# Patient Record
Sex: MALE | Race: WHITE | NOT HISPANIC OR LATINO | Employment: OTHER | URBAN - METROPOLITAN AREA
[De-identification: names, ages, dates, MRNs, and addresses within clinical notes are randomized per-mention and may not be internally consistent; named-entity substitution may affect disease eponyms.]

---

## 2017-03-15 ENCOUNTER — DOCUMENTATION (OUTPATIENT)
Dept: PHYSICAL THERAPY | Facility: HOSPITAL | Age: 47
End: 2017-03-15

## 2017-03-15 DIAGNOSIS — M25.561 RIGHT KNEE PAIN, UNSPECIFIED CHRONICITY: Primary | ICD-10-CM

## 2017-03-15 NOTE — THERAPY DISCHARGE NOTE
Outpatient Physical Therapy Ortho Progress Note/Discharge Summary       Patient Name: Ruperto Michelle  : 1970  MRN: [id number]  Today's Date: 3/15/2017      Visit Date: 03/15/2017    Visit Dx:    ICD-10-CM ICD-9-CM   1. Right knee pain, unspecified chronicity M25.561 719.46       Patient Active Problem List   Diagnosis   • Rupture of anterior cruciate ligament of right knee        No past medical history on file.     Past Surgical History   Procedure Laterality Date   • Vasectomy                               PT Assessment/Plan       03/15/17 0500       PT Assessment    Assessment Comments Pt has made some gains with decreased pain and improved strength and function. He still has episodes of his knee buckling and he still has some discomfort with going up/down stairs. He is independent with his HEP. He has choosen to have ACL reconstructive surgery, but it is not scheduled at this time. He will continue with his HEP leading up to surgery.  -     PT Plan    PT Plan Comments Pt is to continue his HEP until surgery.  -       User Key  (r) = Recorded By, (t) = Taken By, (c) = Cosigned By    Initials Name Provider Type     Hector Blair, PT Physical Therapist                                       PT OP Goals       03/15/17 0500       PT Short Term Goals    STG Date to Achieve 16  -     STG 1 Decrease right knee pain to 3-4/10 with activity.  -     STG 1 Progress Met  -     STG 2 Decrease right knee effusion to WFL.  -     STG 2 Progress Not Met  -     STG 3 Increase right hamstring flexibility to WFL with tesing.  -     STG 3 Progress Not Met  -     STG 4 Pt will be independent with his HEP.  -     STG 4 Progress Met  -     Long Term Goals    LTG Date to Achieve 16  -     LTG 1 decrease right knee pain to 0-1/10 with activity.  -     LTG 1 Progress Not Met  -     LTG 2 Increase right quad and hip strength to 5/5 with testing.  -     LTG 2 Progress Partially Met   -     LTG 3 Pt will be independent with all ADLs without pain and have a LEFS score > 70.  -     LTG 3 Progress Partially Met  -       User Key  (r) = Recorded By, (t) = Taken By, (c) = Cosigned By    Initials Name Provider Type    RUEPRTO Blair, PT Physical Therapist                    Time Calculation:                  OP PT Discharge Summary  Reason for Discharge:  (Pt will have reconstructive surgery-to do HEP until then.)  Outcomes Achieved: Patient able to partially acheive established goals  Discharge Destination: Home with home program      Hector Blair, YASMINE  3/15/2017

## 2017-03-17 ENCOUNTER — PREP FOR SURGERY (OUTPATIENT)
Dept: ORTHOPEDIC SURGERY | Facility: CLINIC | Age: 47
End: 2017-03-17

## 2017-03-17 ENCOUNTER — TELEPHONE (OUTPATIENT)
Dept: ORTHOPEDIC SURGERY | Facility: CLINIC | Age: 47
End: 2017-03-17

## 2017-03-17 DIAGNOSIS — S83.511A RUPTURE OF ANTERIOR CRUCIATE LIGAMENT OF RIGHT KNEE, INITIAL ENCOUNTER: Primary | ICD-10-CM

## 2017-03-17 RX ORDER — PREGABALIN 25 MG/1
75 CAPSULE ORAL ONCE
Status: CANCELLED | OUTPATIENT
Start: 2017-03-17 | End: 2017-03-17

## 2017-03-17 RX ORDER — OXYCODONE HCL 10 MG/1
10 TABLET, FILM COATED, EXTENDED RELEASE ORAL ONCE
Status: CANCELLED | OUTPATIENT
Start: 2017-03-17 | End: 2017-03-17

## 2017-03-17 RX ORDER — CELECOXIB 100 MG/1
200 CAPSULE ORAL ONCE
Status: CANCELLED | OUTPATIENT
Start: 2017-03-17 | End: 2017-03-17

## 2017-03-17 RX ORDER — SODIUM CHLORIDE 0.9 % (FLUSH) 0.9 %
1-10 SYRINGE (ML) INJECTION AS NEEDED
Status: CANCELLED | OUTPATIENT
Start: 2017-03-17

## 2017-03-27 ENCOUNTER — APPOINTMENT (OUTPATIENT)
Dept: LAB | Facility: HOSPITAL | Age: 47
End: 2017-03-27

## 2017-03-27 ENCOUNTER — APPOINTMENT (OUTPATIENT)
Dept: PREADMISSION TESTING | Facility: HOSPITAL | Age: 47
End: 2017-03-27

## 2017-03-27 VITALS
HEART RATE: 71 BPM | DIASTOLIC BLOOD PRESSURE: 99 MMHG | WEIGHT: 236.1 LBS | HEIGHT: 68 IN | BODY MASS INDEX: 35.78 KG/M2 | OXYGEN SATURATION: 96 % | RESPIRATION RATE: 16 BRPM | SYSTOLIC BLOOD PRESSURE: 142 MMHG

## 2017-03-27 DIAGNOSIS — S83.511A RUPTURE OF ANTERIOR CRUCIATE LIGAMENT OF RIGHT KNEE, INITIAL ENCOUNTER: ICD-10-CM

## 2017-03-27 LAB
ANION GAP SERPL CALCULATED.3IONS-SCNC: 13.1 MMOL/L
APTT PPP: 29.9 SECONDS (ref 24.3–38.1)
BASOPHILS # BLD AUTO: 0.03 10*3/MM3 (ref 0–0.2)
BASOPHILS NFR BLD AUTO: 0.8 % (ref 0–2)
BUN BLD-MCNC: 13 MG/DL (ref 6–20)
BUN/CREAT SERPL: 13.8 (ref 7–25)
CALCIUM SPEC-SCNC: 8.9 MG/DL (ref 8.6–10.5)
CHLORIDE SERPL-SCNC: 101 MMOL/L (ref 98–107)
CO2 SERPL-SCNC: 25.9 MMOL/L (ref 22–29)
CREAT BLD-MCNC: 0.94 MG/DL (ref 0.76–1.27)
DEPRECATED RDW RBC AUTO: 40.9 FL (ref 37–54)
EOSINOPHIL # BLD AUTO: 0.1 10*3/MM3 (ref 0.1–0.3)
EOSINOPHIL NFR BLD AUTO: 2.5 % (ref 0–4)
ERYTHROCYTE [DISTWIDTH] IN BLOOD BY AUTOMATED COUNT: 13.2 % (ref 11.5–14.5)
GFR SERPL CREATININE-BSD FRML MDRD: 87 ML/MIN/1.73
GLUCOSE BLD-MCNC: 98 MG/DL (ref 65–99)
HCT VFR BLD AUTO: 45.4 % (ref 42–52)
HGB BLD-MCNC: 15.2 G/DL (ref 14–18)
IMM GRANULOCYTES # BLD: 0.01 10*3/MM3 (ref 0–0.03)
IMM GRANULOCYTES NFR BLD: 0.3 % (ref 0–0.5)
INR PPP: 0.96 (ref 0.9–1.1)
LYMPHOCYTES # BLD AUTO: 1.45 10*3/MM3 (ref 0.6–4.8)
LYMPHOCYTES NFR BLD AUTO: 36.5 % (ref 20–45)
MCH RBC QN AUTO: 28.6 PG (ref 27–31)
MCHC RBC AUTO-ENTMCNC: 33.5 G/DL (ref 31–37)
MCV RBC AUTO: 85.5 FL (ref 80–94)
MONOCYTES # BLD AUTO: 0.23 10*3/MM3 (ref 0–1)
MONOCYTES NFR BLD AUTO: 5.8 % (ref 3–8)
NEUTROPHILS # BLD AUTO: 2.15 10*3/MM3 (ref 1.5–8.3)
NEUTROPHILS NFR BLD AUTO: 54.1 % (ref 45–70)
NRBC BLD MANUAL-RTO: 0 /100 WBC (ref 0–0)
PLATELET # BLD AUTO: 243 10*3/MM3 (ref 140–500)
PMV BLD AUTO: 10.1 FL (ref 7.4–10.4)
POTASSIUM BLD-SCNC: 4.3 MMOL/L (ref 3.5–5.2)
PROTHROMBIN TIME: 12.8 SECONDS (ref 12.1–15)
RBC # BLD AUTO: 5.31 10*6/MM3 (ref 4.7–6.1)
SODIUM BLD-SCNC: 140 MMOL/L (ref 136–145)
WBC NRBC COR # BLD: 3.97 10*3/MM3 (ref 4.8–10.8)

## 2017-03-27 PROCEDURE — 85610 PROTHROMBIN TIME: CPT | Performed by: ORTHOPAEDIC SURGERY

## 2017-03-27 PROCEDURE — 85025 COMPLETE CBC W/AUTO DIFF WBC: CPT | Performed by: ORTHOPAEDIC SURGERY

## 2017-03-27 PROCEDURE — 93010 ELECTROCARDIOGRAM REPORT: CPT | Performed by: INTERNAL MEDICINE

## 2017-03-27 PROCEDURE — 80048 BASIC METABOLIC PNL TOTAL CA: CPT | Performed by: ORTHOPAEDIC SURGERY

## 2017-03-27 PROCEDURE — 85730 THROMBOPLASTIN TIME PARTIAL: CPT | Performed by: ORTHOPAEDIC SURGERY

## 2017-03-27 PROCEDURE — 36415 COLL VENOUS BLD VENIPUNCTURE: CPT

## 2017-03-27 PROCEDURE — 93005 ELECTROCARDIOGRAM TRACING: CPT

## 2017-03-27 NOTE — DISCHARGE INSTRUCTIONS
PRE-ADMISSION TESTING INSTRUCTIONS FOR ADULTS      General Instructions:    • Do not eat or drink after midnight: includes water, mints, or gum. You may brush your teeth.  • Do not smoke, chew tobacco, or drink alcohol.  • Bring medications in original bottles, any inhalers and if applicable your C-PAP/ BI-PAP machine.  • Wear clean comfortable clothes and socks.  • Do not wear contact lenses, lotion, deodorant, or make-up.  Bring a case for your glasses if applicable.   • Bring crutches or walker if applicable.  • Leave all other valuables and jewelry at home.      Preventing a Surgical Site Infection:    • Shower the night before and on the morning of surgery using the chlorhexidine soap you were given.  Use a clean washcloth with the soap. Do not use the CHG soap on your hair, face, or private areas. Wash your body gently for five (5) minutes. Do not scrub your skin too hard.  Dry with a clean towel and dress in clean clothing.    • Do not shave the surgical area for a week prior to surgery  because the razor can irritate skin and make it easier to develop an infection.    • Make sure you, your family, and all healthcare providers clean their hands with soap and water or an alcohol based hand  before caring for you or your wound.    • If at all possible, quit smoking as many days before surgery as you can.    Day of surgery:    Your surgeon’s office will advise you of your arrival time for the day of surgery.    Upon arrival, a Pre-op nurse and Anesthesia provider will review your health history, obtain vital signs, and answer questions you may have.  The only belongings needed at this time will be your home medications and if applicable your C-PAP/BI-PAP machine.  If you are staying overnight your family can leave the rest of your belongings in the car and bring them to your room later.  A Pre-op nurse will start an IV and you may receive medication in preparation for surgery, including something to  help you relax.  Your family will be able to see you in the Pre-op area.  While you are in surgery your family should notify the waiting room  if they leave the waiting room area and provide a contact phone number.    IF you have any questions, you can call the Pre-Admission Department at [phone] or your surgeon's office.    Please be aware that surgery does come with discomfort.  We want to make every effort to control your discomfort so please discuss any uncontrolled symptoms with your nurse.   Your doctor will most likely have prescribed pain medications.      If you are going home after surgery, you will receive individualized written care instructions before being discharged.  A responsible adult (over the age of 18) must drive you to and from the hospital on the day of your surgery and stay with you for 24 hours after anesthesia.    If you are staying overnight following surgery, you will be transported to your hospital room following the recovery period.  [facility] has all private rooms.    Deductibles and co-payments are collected on the day of service. Please be prepared to pay the required co-pay, deductible or deposit on the day of service as defined by your plan.

## 2017-04-07 ENCOUNTER — ANESTHESIA EVENT (OUTPATIENT)
Dept: PERIOP | Facility: HOSPITAL | Age: 47
End: 2017-04-07

## 2017-04-09 NOTE — H&P
Patient ID: Ruperto Michelle is a 46 y.o. male.     Chief Complaint:  Right knee pain and instability  History of Present Illness  Ruperto Michelle presents for surgical treatment of right knee ACL rupture.  He has had issues with his right knee including pain and instability that began in September 2016 when he slipped on deltoid, noted a sudden buckling giving way of his right knee at that point time and since then assess issues with intermittent burning pain to his right knee. He is also had buckling episodes to his knee approximately weekly though that has improved over the last several weeks. Pain and issues especially buckling is exacerbated primarily with going up and down stairs, rates associated pain as 2-3 out of 10, aching in nature, localized primarily to the lateral aspect of the right knee. Denies any maximilian locking or catching. Denies prior injury or issues to right knee. Denies any right hip or groin pain, no fevers chills or sweats. Mild intermittent swelling.    No current facility-administered medications on file prior to encounter.      No current outpatient prescriptions on file prior to encounter.       No Known Allergies        Social History          Occupational History   • Not on file.           Social History Main Topics   • Smoking status: Current Every Day Smoker   • Smokeless tobacco: Never Used   • Alcohol use Yes   • Drug use: Defer   • Sexual activity: Defer      History reviewed. No pertinent past medical history.        Past Surgical History   Procedure Laterality Date   • Vasectomy             History reviewed. No pertinent family history.        Review of Systems   Constitutional: Negative for chills, diaphoresis, fever and unexpected weight change.   HENT: Negative for hearing loss, nosebleeds, sore throat and tinnitus.   Eyes: Negative for pain and visual disturbance.   Respiratory: Negative for cough, shortness of breath and wheezing.   Cardiovascular: Negative for chest pain  "and palpitations.   Gastrointestinal: Negative for abdominal pain, diarrhea, nausea and vomiting.   Endocrine: Negative for cold intolerance, heat intolerance and polydipsia.   Genitourinary: Negative for difficulty urinating, dysuria and hematuria.   Musculoskeletal: Negative for arthralgias, joint swelling and myalgias.   Skin: Negative for rash and wound.   Allergic/Immunologic: Negative for environmental allergies.   Neurological: Negative for dizziness, syncope and numbness.   Hematological: Does not bruise/bleed easily.   Psychiatric/Behavioral: Negative for dysphoric mood and sleep disturbance. The patient is not nervous/anxious.   All other systems reviewed and are negative.            Objective:      Vitals:     12/02/16 0846   BP: 134/83   Pulse: 68   Weight: 220 lb (99.8 kg)   Height: 67\" (170.2 cm)          Last 2 weights     12/02/16 0846   Weight: 220 lb (99.8 kg)      Body mass index is 34.46 kg/(m^2).  Physical Exam     Vital signs reviewed.   General: No acute distress.  Eyes: conjunctiva clear; pupils equally round and reactive  ENT: external ears and nose atraumatic; oropharynx clear  CV: no peripheral edema  Resp: normal respiratory effort  Skin: no rashes or wounds; normal turgor  Psych: mood and affect appropriate; recent and remote memory intact      Ortho Exam    Right knee-active range of motion 0-130°, 4+ out of 5 strength on flexion and extension. Grade 2B Lachman, 2+ anterior drawer soft endpoint, negative posterior drawer, negative posterior lateral drawer, negative dial test. Stable to varus valgus stress 0 and 30°. Minimal effusion noted, no tenderness along medial or lateral joint line, negative Gabriela exam. Negative active patellar compression test, negative patellar apprehension test. Brisk cap refill all digits, 2+ dorsalis pulse right foot. Positive sensation light touch all discretions right lower extremity symmetric to the left.  Imaging:  Review of outside x-ray report, no " images available, indicate no evidence of fracture, dislocation, or subluxation.     Review of outside MRI as well as radiology report indicates disruption of anterior cruciate ligament, read as a partial tear by radiologist, on my review I think this is more consistent with a complete tear especially considering patient's physical exam. No evidence of medial or lateral meniscal tear, no evidence of full-thickness chondral defect. Subchondral edema noted in the posterior lateral tibial plateau. PCL intact  Assessment:        1. Rupture of anterior cruciate ligament of right knee, initial encounter           Plan:  ELSY query complete.         Discussed treatment options at length with patient today. Given recurrence instability as well as desire to participate and pivoting and cutting recreational activities, he would like to proceed with surgical reconstruction of his anterior cruciate ligament.  Plan will be for Right knee ACL reconstruction with allograft, meniscal cartilage surgery as indicated. I reviewed details of procedure including pertinent anatomy with the patient as well as risks benefits and alternatives, with the risks including not limited to neurovascular damage, bleeding, infection, re-tear of graft, failure of healing of graft, loss of motion, weakness, stiffness, instability, chondrolysis, DVT, pulmonary embolus, death, stroke, complex regional pain syndrome, myocardial infarction, need for additional procedures.  Patient understood all these had all questions answered before consenting to proceed with surgery.  No guarantees were given regarding results of procedure.     Ruperto Michelle was in agreement with plan and had all questions answered.           Luis transcription disclaimer     Much of this encounter note is an electronic transcription/translation of spoken language to printed text. The electronic translation of spoken language may permit erroneous, or at times, nonsensical words or  phrases to be inadvertently transcribed. Although I have reviewed the note for such errors, some may still exist.

## 2017-04-10 ENCOUNTER — ANESTHESIA (OUTPATIENT)
Dept: PERIOP | Facility: HOSPITAL | Age: 47
End: 2017-04-10

## 2017-04-10 ENCOUNTER — HOSPITAL ENCOUNTER (OUTPATIENT)
Facility: HOSPITAL | Age: 47
Setting detail: HOSPITAL OUTPATIENT SURGERY
Discharge: HOME | End: 2017-04-10
Attending: ORTHOPAEDIC SURGERY | Admitting: ORTHOPAEDIC SURGERY

## 2017-04-10 VITALS
DIASTOLIC BLOOD PRESSURE: 83 MMHG | OXYGEN SATURATION: 97 % | BODY MASS INDEX: 35.49 KG/M2 | WEIGHT: 233.4 LBS | HEART RATE: 88 BPM | RESPIRATION RATE: 12 BRPM | SYSTOLIC BLOOD PRESSURE: 126 MMHG | TEMPERATURE: 97.8 F

## 2017-04-10 DIAGNOSIS — S83.511A RUPTURE OF ANTERIOR CRUCIATE LIGAMENT OF RIGHT KNEE, INITIAL ENCOUNTER: ICD-10-CM

## 2017-04-10 PROCEDURE — C1713 ANCHOR/SCREW BN/BN,TIS/BN: HCPCS | Performed by: ORTHOPAEDIC SURGERY

## 2017-04-10 PROCEDURE — 29888 ARTHRS AID ACL RPR/AGMNTJ: CPT | Performed by: ORTHOPAEDIC SURGERY

## 2017-04-10 PROCEDURE — 25010000002 ONDANSETRON PER 1 MG: Performed by: NURSE ANESTHETIST, CERTIFIED REGISTERED

## 2017-04-10 PROCEDURE — 25010000003 CEFAZOLIN PER 500 MG: Performed by: ORTHOPAEDIC SURGERY

## 2017-04-10 PROCEDURE — 25010000002 DEXAMETHASONE PER 1 MG: Performed by: NURSE ANESTHETIST, CERTIFIED REGISTERED

## 2017-04-10 PROCEDURE — 25010000002 FENTANYL CITRATE (PF) 100 MCG/2ML SOLUTION: Performed by: NURSE ANESTHETIST, CERTIFIED REGISTERED

## 2017-04-10 PROCEDURE — 25010000002 MIDAZOLAM PER 1 MG: Performed by: NURSE ANESTHETIST, CERTIFIED REGISTERED

## 2017-04-10 PROCEDURE — 25010000002 MIDAZOLAM PER 1 MG

## 2017-04-10 PROCEDURE — 25010000002 HYDROMORPHONE PER 4 MG: Performed by: NURSE ANESTHETIST, CERTIFIED REGISTERED

## 2017-04-10 PROCEDURE — 25010000002 PROPOFOL 10 MG/ML EMULSION: Performed by: NURSE ANESTHETIST, CERTIFIED REGISTERED

## 2017-04-10 PROCEDURE — 25010000002 HYDROMORPHONE PER 4 MG

## 2017-04-10 PROCEDURE — 25010000002 DEXAMETHASONE PER 1 MG

## 2017-04-10 PROCEDURE — 25010000002 ONDANSETRON PER 1 MG

## 2017-04-10 DEVICE — BIOSURE REGENSORB INTERFERENCE                                    SCREW 7 MM X 25MM
Type: IMPLANTABLE DEVICE | Site: KNEE | Status: FUNCTIONAL
Brand: BIOSURE

## 2017-04-10 DEVICE — IMPLANTABLE DEVICE: Type: IMPLANTABLE DEVICE | Site: KNEE | Status: FUNCTIONAL

## 2017-04-10 DEVICE — BIOSURE REGENSORB INTERFERENCE                                    SCREW 9 MM X 25MM
Type: IMPLANTABLE DEVICE | Site: KNEE | Status: FUNCTIONAL
Brand: BIOSURE

## 2017-04-10 RX ORDER — OXYCODONE HCL 10 MG/1
TABLET, FILM COATED, EXTENDED RELEASE ORAL
Status: COMPLETED
Start: 2017-04-10 | End: 2017-04-10

## 2017-04-10 RX ORDER — DEXAMETHASONE SODIUM PHOSPHATE 4 MG/ML
4 INJECTION, SOLUTION INTRA-ARTICULAR; INTRALESIONAL; INTRAMUSCULAR; INTRAVENOUS; SOFT TISSUE ONCE
Status: COMPLETED | OUTPATIENT
Start: 2017-04-10 | End: 2017-04-10

## 2017-04-10 RX ORDER — DEXAMETHASONE SODIUM PHOSPHATE 4 MG/ML
INJECTION, SOLUTION INTRA-ARTICULAR; INTRALESIONAL; INTRAMUSCULAR; INTRAVENOUS; SOFT TISSUE
Status: COMPLETED
Start: 2017-04-10 | End: 2017-04-10

## 2017-04-10 RX ORDER — LIDOCAINE HYDROCHLORIDE 20 MG/ML
INJECTION, SOLUTION INFILTRATION; PERINEURAL AS NEEDED
Status: DISCONTINUED | OUTPATIENT
Start: 2017-04-10 | End: 2017-04-10 | Stop reason: SURG

## 2017-04-10 RX ORDER — LIDOCAINE HYDROCHLORIDE 10 MG/ML
0.5 INJECTION, SOLUTION INFILTRATION; PERINEURAL ONCE AS NEEDED
Status: COMPLETED | OUTPATIENT
Start: 2017-04-10 | End: 2017-04-10

## 2017-04-10 RX ORDER — MAGNESIUM HYDROXIDE 1200 MG/15ML
LIQUID ORAL AS NEEDED
Status: DISCONTINUED | OUTPATIENT
Start: 2017-04-10 | End: 2017-04-10 | Stop reason: HOSPADM

## 2017-04-10 RX ORDER — DEXAMETHASONE SODIUM PHOSPHATE 10 MG/ML
INJECTION INTRAMUSCULAR; INTRAVENOUS AS NEEDED
Status: DISCONTINUED | OUTPATIENT
Start: 2017-04-10 | End: 2017-04-10 | Stop reason: SURG

## 2017-04-10 RX ORDER — CELECOXIB 100 MG/1
CAPSULE ORAL
Status: COMPLETED
Start: 2017-04-10 | End: 2017-04-10

## 2017-04-10 RX ORDER — SODIUM CHLORIDE 0.9 % (FLUSH) 0.9 %
1-10 SYRINGE (ML) INJECTION AS NEEDED
Status: DISCONTINUED | OUTPATIENT
Start: 2017-04-10 | End: 2017-04-10 | Stop reason: HOSPADM

## 2017-04-10 RX ORDER — BUPIVACAINE HYDROCHLORIDE AND EPINEPHRINE 5; 5 MG/ML; UG/ML
INJECTION, SOLUTION EPIDURAL; INTRACAUDAL; PERINEURAL AS NEEDED
Status: DISCONTINUED | OUTPATIENT
Start: 2017-04-10 | End: 2017-04-10 | Stop reason: SURG

## 2017-04-10 RX ORDER — MIDAZOLAM HYDROCHLORIDE 1 MG/ML
2 INJECTION INTRAMUSCULAR; INTRAVENOUS
Status: DISCONTINUED | OUTPATIENT
Start: 2017-04-10 | End: 2017-04-10 | Stop reason: HOSPADM

## 2017-04-10 RX ORDER — HYDROMORPHONE HCL 110MG/55ML
PATIENT CONTROLLED ANALGESIA SYRINGE INTRAVENOUS
Status: COMPLETED
Start: 2017-04-10 | End: 2017-04-10

## 2017-04-10 RX ORDER — HYDROCODONE BITARTRATE AND ACETAMINOPHEN 5; 325 MG/1; MG/1
1 TABLET ORAL ONCE AS NEEDED
Status: COMPLETED | OUTPATIENT
Start: 2017-04-10 | End: 2017-04-10

## 2017-04-10 RX ORDER — SENNOSIDES 8.6 MG
1 TABLET ORAL NIGHTLY
Qty: 30 EACH | Refills: 0 | Status: SHIPPED | OUTPATIENT
Start: 2017-04-10 | End: 2017-04-18

## 2017-04-10 RX ORDER — MINERAL OIL 471.99 G/472ML
OIL TOPICAL AS NEEDED
Status: DISCONTINUED | OUTPATIENT
Start: 2017-04-10 | End: 2017-04-10 | Stop reason: HOSPADM

## 2017-04-10 RX ORDER — SODIUM CHLORIDE, SODIUM LACTATE, POTASSIUM CHLORIDE, CALCIUM CHLORIDE 600; 310; 30; 20 MG/100ML; MG/100ML; MG/100ML; MG/100ML
1000 INJECTION, SOLUTION INTRAVENOUS CONTINUOUS PRN
Status: DISCONTINUED | OUTPATIENT
Start: 2017-04-10 | End: 2017-04-10 | Stop reason: HOSPADM

## 2017-04-10 RX ORDER — OXYCODONE HYDROCHLORIDE AND ACETAMINOPHEN 5; 325 MG/1; MG/1
1-2 TABLET ORAL EVERY 4 HOURS PRN
Qty: 60 TABLET | Refills: 0 | Status: SHIPPED | OUTPATIENT
Start: 2017-04-10 | End: 2017-04-18

## 2017-04-10 RX ORDER — LIDOCAINE HYDROCHLORIDE 10 MG/ML
INJECTION, SOLUTION EPIDURAL; INFILTRATION; INTRACAUDAL; PERINEURAL
Status: COMPLETED
Start: 2017-04-10 | End: 2017-04-10

## 2017-04-10 RX ORDER — PROPOFOL 10 MG/ML
VIAL (ML) INTRAVENOUS AS NEEDED
Status: DISCONTINUED | OUTPATIENT
Start: 2017-04-10 | End: 2017-04-10 | Stop reason: SURG

## 2017-04-10 RX ORDER — ONDANSETRON 4 MG/1
4 TABLET, FILM COATED ORAL EVERY 8 HOURS PRN
Qty: 30 TABLET | Refills: 0 | Status: SHIPPED | OUTPATIENT
Start: 2017-04-10 | End: 2017-04-18

## 2017-04-10 RX ORDER — FAMOTIDINE 10 MG/ML
INJECTION, SOLUTION INTRAVENOUS
Status: COMPLETED
Start: 2017-04-10 | End: 2017-04-10

## 2017-04-10 RX ORDER — ONDANSETRON 2 MG/ML
4 INJECTION INTRAMUSCULAR; INTRAVENOUS ONCE AS NEEDED
Status: COMPLETED | OUTPATIENT
Start: 2017-04-10 | End: 2017-04-10

## 2017-04-10 RX ORDER — MIDAZOLAM HYDROCHLORIDE 1 MG/ML
1 INJECTION INTRAMUSCULAR; INTRAVENOUS
Status: DISCONTINUED | OUTPATIENT
Start: 2017-04-10 | End: 2017-04-10 | Stop reason: HOSPADM

## 2017-04-10 RX ORDER — HYDROMORPHONE HCL 110MG/55ML
0.5 PATIENT CONTROLLED ANALGESIA SYRINGE INTRAVENOUS
Status: DISCONTINUED | OUTPATIENT
Start: 2017-04-10 | End: 2017-04-10 | Stop reason: HOSPADM

## 2017-04-10 RX ORDER — ASPIRIN 325 MG
325 TABLET, DELAYED RELEASE (ENTERIC COATED) ORAL DAILY
Qty: 14 TABLET | Refills: 0 | Status: SHIPPED | OUTPATIENT
Start: 2017-04-10 | End: 2017-04-24

## 2017-04-10 RX ORDER — FAMOTIDINE 10 MG/ML
20 INJECTION, SOLUTION INTRAVENOUS
Status: DISCONTINUED | OUTPATIENT
Start: 2017-04-10 | End: 2017-04-10 | Stop reason: HOSPADM

## 2017-04-10 RX ORDER — PREGABALIN 75 MG/1
CAPSULE ORAL
Status: COMPLETED
Start: 2017-04-10 | End: 2017-04-10

## 2017-04-10 RX ORDER — HYDROCODONE BITARTRATE AND ACETAMINOPHEN 5; 325 MG/1; MG/1
TABLET ORAL
Status: COMPLETED
Start: 2017-04-10 | End: 2017-04-10

## 2017-04-10 RX ORDER — ONDANSETRON 2 MG/ML
INJECTION INTRAMUSCULAR; INTRAVENOUS
Status: COMPLETED
Start: 2017-04-10 | End: 2017-04-10

## 2017-04-10 RX ORDER — FENTANYL CITRATE 50 UG/ML
INJECTION, SOLUTION INTRAMUSCULAR; INTRAVENOUS AS NEEDED
Status: DISCONTINUED | OUTPATIENT
Start: 2017-04-10 | End: 2017-04-10 | Stop reason: SURG

## 2017-04-10 RX ORDER — OXYCODONE HCL 10 MG/1
10 TABLET, FILM COATED, EXTENDED RELEASE ORAL ONCE
Status: COMPLETED | OUTPATIENT
Start: 2017-04-10 | End: 2017-04-10

## 2017-04-10 RX ORDER — PREGABALIN 75 MG/1
75 CAPSULE ORAL ONCE
Status: COMPLETED | OUTPATIENT
Start: 2017-04-10 | End: 2017-04-10

## 2017-04-10 RX ORDER — CELECOXIB 100 MG/1
200 CAPSULE ORAL ONCE
Status: COMPLETED | OUTPATIENT
Start: 2017-04-10 | End: 2017-04-10

## 2017-04-10 RX ORDER — MIDAZOLAM HYDROCHLORIDE 1 MG/ML
INJECTION INTRAMUSCULAR; INTRAVENOUS
Status: COMPLETED
Start: 2017-04-10 | End: 2017-04-10

## 2017-04-10 RX ORDER — KETAMINE HYDROCHLORIDE 100 MG/ML
INJECTION INTRAMUSCULAR; INTRAVENOUS AS NEEDED
Status: DISCONTINUED | OUTPATIENT
Start: 2017-04-10 | End: 2017-04-10 | Stop reason: SURG

## 2017-04-10 RX ADMIN — HYDROCODONE BITARTRATE AND ACETAMINOPHEN 1 TABLET: 5; 325 TABLET ORAL at 10:15

## 2017-04-10 RX ADMIN — FENTANYL CITRATE 25 MCG: 50 INJECTION, SOLUTION INTRAMUSCULAR; INTRAVENOUS at 08:07

## 2017-04-10 RX ADMIN — FAMOTIDINE 20 MG: 10 INJECTION, SOLUTION INTRAVENOUS at 07:03

## 2017-04-10 RX ADMIN — CELECOXIB 200 MG: 100 CAPSULE ORAL at 06:06

## 2017-04-10 RX ADMIN — SODIUM CHLORIDE, POTASSIUM CHLORIDE, SODIUM LACTATE AND CALCIUM CHLORIDE 1000 ML: 600; 310; 30; 20 INJECTION, SOLUTION INTRAVENOUS at 14:30

## 2017-04-10 RX ADMIN — MIDAZOLAM HYDROCHLORIDE 1 MG: 1 INJECTION, SOLUTION INTRAMUSCULAR; INTRAVENOUS at 07:09

## 2017-04-10 RX ADMIN — PREGABALIN 75 MG: 75 CAPSULE ORAL at 06:07

## 2017-04-10 RX ADMIN — LIDOCAINE HYDROCHLORIDE 0.1 ML: 10 INJECTION, SOLUTION INFILTRATION; PERINEURAL at 06:10

## 2017-04-10 RX ADMIN — HYDROMORPHONE HYDROCHLORIDE 0.5 MG: 2 INJECTION, SOLUTION INTRAMUSCULAR; INTRAVENOUS; SUBCUTANEOUS at 09:58

## 2017-04-10 RX ADMIN — HYDROMORPHONE HYDROCHLORIDE 0.5 MG: 2 INJECTION, SOLUTION INTRAMUSCULAR; INTRAVENOUS; SUBCUTANEOUS at 09:49

## 2017-04-10 RX ADMIN — HYDROMORPHONE HYDROCHLORIDE 0.5 MG: 2 INJECTION, SOLUTION INTRAMUSCULAR; INTRAVENOUS; SUBCUTANEOUS at 10:16

## 2017-04-10 RX ADMIN — DEXAMETHASONE SODIUM PHOSPHATE 4 MG: 4 INJECTION, SOLUTION INTRA-ARTICULAR; INTRALESIONAL; INTRAMUSCULAR; INTRAVENOUS; SOFT TISSUE at 07:04

## 2017-04-10 RX ADMIN — SODIUM CHLORIDE, POTASSIUM CHLORIDE, SODIUM LACTATE AND CALCIUM CHLORIDE 1000 ML: 600; 310; 30; 20 INJECTION, SOLUTION INTRAVENOUS at 06:10

## 2017-04-10 RX ADMIN — LIDOCAINE HYDROCHLORIDE 100 MG: 20 INJECTION, SOLUTION INFILTRATION; PERINEURAL at 09:30

## 2017-04-10 RX ADMIN — ONDANSETRON 4 MG: 2 INJECTION, SOLUTION INTRAMUSCULAR; INTRAVENOUS at 07:03

## 2017-04-10 RX ADMIN — OXYCODONE HYDROCHLORIDE 10 MG: 10 TABLET, FILM COATED, EXTENDED RELEASE ORAL at 06:06

## 2017-04-10 RX ADMIN — LIDOCAINE HYDROCHLORIDE 0.1 ML: 10 INJECTION, SOLUTION EPIDURAL; INFILTRATION; INTRACAUDAL; PERINEURAL at 06:10

## 2017-04-10 RX ADMIN — LIDOCAINE HYDROCHLORIDE 100 MG: 20 INJECTION, SOLUTION INFILTRATION; PERINEURAL at 07:40

## 2017-04-10 RX ADMIN — MIDAZOLAM HYDROCHLORIDE 1 MG: 1 INJECTION, SOLUTION INTRAMUSCULAR; INTRAVENOUS at 07:05

## 2017-04-10 RX ADMIN — DEXAMETHASONE SODIUM PHOSPHATE 4 MG: 4 INJECTION, SOLUTION INTRAMUSCULAR; INTRAVENOUS at 07:04

## 2017-04-10 RX ADMIN — OXYCODONE HCL 10 MG: 10 TABLET, FILM COATED, EXTENDED RELEASE ORAL at 06:06

## 2017-04-10 RX ADMIN — CEFAZOLIN SODIUM 2 G: 2 SOLUTION INTRAVENOUS at 07:48

## 2017-04-10 RX ADMIN — FENTANYL CITRATE 25 MCG: 50 INJECTION, SOLUTION INTRAMUSCULAR; INTRAVENOUS at 07:49

## 2017-04-10 RX ADMIN — ONDANSETRON 4 MG: 2 INJECTION, SOLUTION INTRAMUSCULAR; INTRAVENOUS at 14:18

## 2017-04-10 RX ADMIN — LIDOCAINE HYDROCHLORIDE 2 MG: 20 INJECTION, SOLUTION INFILTRATION; PERINEURAL at 07:15

## 2017-04-10 RX ADMIN — DEXAMETHASONE SODIUM PHOSPHATE 4 MG: 10 INJECTION INTRAMUSCULAR; INTRAVENOUS at 07:15

## 2017-04-10 RX ADMIN — ONDANSETRON 4 MG: 2 INJECTION INTRAMUSCULAR; INTRAVENOUS at 07:03

## 2017-04-10 RX ADMIN — HYDROMORPHONE HYDROCHLORIDE 0.5 MG: 2 INJECTION, SOLUTION INTRAMUSCULAR; INTRAVENOUS; SUBCUTANEOUS at 10:46

## 2017-04-10 RX ADMIN — MIDAZOLAM HYDROCHLORIDE 1 MG: 1 INJECTION, SOLUTION INTRAMUSCULAR; INTRAVENOUS at 07:26

## 2017-04-10 RX ADMIN — PROPOFOL 200 MG: 10 INJECTION, EMULSION INTRAVENOUS at 07:43

## 2017-04-10 RX ADMIN — KETAMINE HYDROCHLORIDE 20 MG: 100 INJECTION INTRAMUSCULAR; INTRAVENOUS at 07:50

## 2017-04-10 RX ADMIN — KETAMINE HYDROCHLORIDE 20 MG: 100 INJECTION INTRAMUSCULAR; INTRAVENOUS at 08:56

## 2017-04-10 RX ADMIN — BUPIVACAINE HYDROCHLORIDE AND EPINEPHRINE 25 ML: 5; 5 INJECTION, SOLUTION EPIDURAL; INTRACAUDAL; PERINEURAL at 07:15

## 2017-04-10 NOTE — ANESTHESIA PROCEDURE NOTES
Peripheral Block    Patient location during procedure: pre-op  Start time: 4/10/2017 7:12 AM  Stop time: 4/10/2017 8:14 AM  Reason for block: procedure for pain, at surgeon's request and post-op pain management  Performed by  CRNA: OFELIA MALAVE  Preanesthetic Checklist  Completed: patient identified, site marked, surgical consent, pre-op evaluation, timeout performed, IV checked, risks and benefits discussed and monitors and equipment checked  Peripheral Block Prep:  Sterile barriers:cap and gloves  Prep: ChloraPrep  Patient monitoring: blood pressure monitoring, continuous pulse oximetry and EKG  Peripheral Procedure  Sedation:yes  Guidance:ultrasound guided  Images:still images obtained    Laterality:right  Block Type:adductor canal block  Injection Technique:single-shot  Needle Type:echogenic  Needle Gauge:21 G  ULTRASOUND INTERPRETATION. Using ultrasound guidance a 21 G gauge needle was placed in close proximity to the nerve, at which point, under ultrasound guidance anesthetic was injected in the area of the nerve and spread of the anesthesia was seen on ultrasound in close proximity thereto.  There were no abnormalities seen on ultrasound; a digital image was taken; and the patient tolerated the procedure with no complications.   Medications  Local Injected:bupivacaine 0.5% with epinephrine Local Amount Injected:25mL  Post Assessment  Injection Assessment: negative aspiration for heme, no paresthesia on injection and incremental injection  Patient Tolerance:comfortable throughout block  Complications:no

## 2017-04-10 NOTE — PLAN OF CARE
Problem: Patient Care Overview (Adult)  Goal: Plan of Care Review  Outcome: Outcome(s) achieved Date Met:  04/10/17    04/10/17 1026   Coping/Psychosocial Response Interventions   Plan Of Care Reviewed With patient;spouse   Patient Care Overview   Progress no change   Outcome Evaluation   Outcome Summary/Follow up Plan Vital signs stable; ready for discharge       Goal: Adult Individualization and Mutuality  Outcome: Outcome(s) achieved Date Met:  04/10/17  Goal: Discharge Needs Assessment  Outcome: Outcome(s) achieved Date Met:  04/10/17    Problem: Perioperative Period (Adult)  Goal: Signs and Symptoms of Listed Potential Problems Will be Absent or Manageable (Perioperative Period)  Outcome: Outcome(s) achieved Date Met:  04/10/17

## 2017-04-10 NOTE — ANESTHESIA PREPROCEDURE EVALUATION
Anesthesia Evaluation     Patient summary reviewed and Nursing notes reviewed   no history of anesthetic complications:  NPO Status: > 8 hours   Airway   Mallampati: II  TM distance: >3 FB  Neck ROM: full  no difficulty expected  Dental      Pulmonary - negative pulmonary ROS    breath sounds clear to auscultation  Cardiovascular   Exercise tolerance: good (4-7 METS)    ECG reviewed  Rhythm: regular  Rate: normal    (+) dysrhythmias (pt states extra beat), hyperlipidemia    ROS comment: RR Interval= 1017 ms  VA Interval= 184 ms  QRSD Interval= 90 ms  QT Interval= 400 ms  QTc Interval= 397 ms  Heart Rate= 59 ms  P Axis= 39 deg  QRS Axis= 12 deg  T Wave Axis= 18 deg  I: 40 Axis= -7 deg  T: 40 Axis= 26 deg  ST Axis= 37 deg  SINUS RHYTHM  NO PRIOR TRACING AVAILABLE FOR COMPARISON  Electronically Signed by:  Zoe Vázquez ([facility]) 27-Mar-2017 13:17:57  Date and Time of Study: 2017-03-27 09:38:23    Neuro/Psych- negative ROS  GI/Hepatic/Renal/Endo    (+) obesity,      Musculoskeletal     (+) back pain (LBP),   Abdominal   (+) obese,    Substance History   (+) alcohol use (3 X PER WEEK),      OB/GYN          Other - negative ROS                                   Anesthesia Plan    ASA 2     general and regional     intravenous induction   Anesthetic plan and risks discussed with patient.  Use of blood products discussed with patient  Consented to blood products.

## 2017-04-10 NOTE — ANESTHESIA POSTPROCEDURE EVALUATION
Patient: Ruperto Michelle    Procedure Summary     Date Anesthesia Start Anesthesia Stop Room / Location    04/10/17 0737 0936 [facility]       Procedure Diagnosis Surgeon Provider    KNEE ANTERIOR CRUCIATE LIGAMENT RECONSTRUCTION WITH ALLOGRAFT MEDIDAL FEMORAL CONDYLE CHRONDROPLASTY (Right Knee) Rupture of anterior cruciate ligament of right knee, initial encounter  (Rupture of anterior cruciate ligament of right knee, initial encounter [S83.511A]) MD Roxie Gaston CRNA          Anesthesia Type: general, regional  Last vitals  /81 (04/10/17 1500)    Temp      Pulse 82 (04/10/17 1500)   Resp 12 (04/10/17 1500)    SpO2 97 % (04/10/17 1500)      Post Anesthesia Care and Evaluation    Patient location during evaluation: bedside  Patient participation: complete - patient participated  Level of consciousness: awake and alert  Pain management: adequate  Airway patency: patent  Anesthetic complications: No anesthetic complications  PONV Status: none  Cardiovascular status: acceptable  Respiratory status: acceptable  Hydration status: acceptable

## 2017-04-10 NOTE — PLAN OF CARE
Problem: Patient Care Overview (Adult)  Goal: Adult Individualization and Mutuality    04/10/17 0724   Individualization   Patient Specific Preferences Goes by Angelito

## 2017-04-10 NOTE — OP NOTE
PREOPERATIVE DIAGNOSES:   1. Right knee anterior cruciate ligament tear.   2. Right knee medial femoral condyle chondromalacia     POSTOPERATIVE DIAGNOSES:   1. Right knee anterior cruciate ligament tear.   2.  Right knee medial femoral condyle chondromalacia     PROCEDURES PERFORMED:   1. Right knee anterior cruciate ligament reconstruction with bone patella  tendon bone allograft.   2.  Right knee medial femoral condyle chondroplasty     SURGEON: Rudy Gastelum MD      ASSISTANT: Jen      ANESTHESIA: General endotracheal with regional block     ESTIMATED BLOOD LOSS: 50 mL.      URINE OUTPUT: Not recorded.      FLUIDS: Per anesthesia     DRAINS: None.      SPECIMENS: None.      COMPLICATIONS: None.       Tourniquet Times:     Inflated: 4/10/2017  8:10 AM     Deflated: 4/10/2017  9:24 AM     IMPLANTS USED: BTB preshaped 10 mm bone plug allograft from RTI, 7x25 mm Regenesorb Ramírez and nephew screw for  femoral side, and 9 x 25 mm Regenesorb Ramírez and nephew screw for tibial fixation.      PHYSICAL EXAMINATION: Examination under anesthesia: Passive range of motion of  right knee 15 degrees hyperextension to 130', no effusion noted, stable to varus/valgus stress at 0 and 30 degrees. Grade 2B Lachman, 2+ anterior drawer, no endpoint, negative posterior drawer, grade 1 posterolateral drawer, negative Dial test, midline patellar tracking, no inverted J sign, positive pivot shift test, 2+ dorsalis pedis pulse, right foot. No open wounds, lacerations, or abrasions over knee.      FINDINGS: Arthroscopic findings:   1. Suprapatellar pouch free of loose bodies.   2. Medial and lateral gutters free of loose bodies.   3. Patellofemoral joint: Grade 3/4 chondral wear the central portion of the trochlea   4. Medial compartment: Small 1 cm x 1 cm chondral defect-grade 3 cartilage wear-along the lateral edge of medial femoral condyle and central portion, no evidence of meniscal pathology  5. Notch: ACL torn, PCL intact.   6.  Lateral compartment: No evidence of meniscal or articular cartilage pathology     INDICATIONS: The patient is a pleasant 46 y.o. male with significant history of buckling episode to the right knee over last several years with associated pain and swelling. MRI indicated a high-grade partial rupture of anterior cruciate ligament but patient had significant clinical issues with the laxity and his laxity on physical exam in the office was much more appreciable than would be expected from a partial tear. Given buckling episodes as well as associated pain and the patient's desire to continue to participate in cutting and pivoting activities, patient wished to proceed with above-mentioned surgery.     INFORMED CONSENT: Patient was explained details of the procedure, as well as risks, benefits, and alternatives as documented on the History and Physical, and had all questions answered prior to signing the operative consent form. No guarantees were given in regard to results of the surgery.        DETAILS OF PROCEDURE: The patient was seen, evaluated, and cleared by  anesthesia. Met in the preoperative holding area. Operative site was  marked, consent was reviewed, history and physical was updated, and  preoperative labs were reviewed. A regional block was then placed per  anesthesia. The patient was then taken to the operating room and placed in a  supine position on a regular OR table. After successful intubation per  anesthesia, an examination under anesthesia was carried out on the right knee with findings as noted above. Nonsterile tourniquet was applied to the right lower extremity. The right leg placed in a leg iqbal, the contralateral leg placed in stirrups. The patient secured to table with waist strap. All bony prominences were well padded. The right lower extremity was then sterilely prepped and draped in a standard fashion.      A formal timeout was completed, including confirmation of history and  physical,  operative consent, surgical site, patient identification number, and  preoperative antibiotic administration. The right leg was then exsanguinated and tourniquet inflated to 250 mmHg. Procedure was begun with creation of the anterolateral portal with the #11  blade followed by insertion of a blunt trocar and cannula. The scope was  inserted at this point in time. Anterior medial and far medial portals were  then created through the patellar tendon incision with the spinal needle using  outside-in technique. A probe was then inserted and diagnostic arthroscopic exam was  carried out with findings as noted above.     The bone patellar tendon bone allograft was then opened on the back table after appropriate following per protocol and prepared with bone plugs being sized as 10 mm on each end.  The patellar bone plug was cut down to 22 mm in length and each end was bulleted to allow for easier passage.  Patellar tendon length was noted to be 45 mm. Two 2 drill holes were placed through each bone plug and sutures were passed for later passage of  the graft.     Attention was then returned to the knee where the medial femoral condyle defect was addressed with ablation wand used to complete a minimal chondroplasty debriding the frayed edges of the chondral defect to a smooth stable border with no evidence of residual delaminating edges of cartilage at this time.    Attention was then returned to ACL reconstruction with a minimal notchplasty as  well as debridement of the fat pad completed at this point in time to allow for  appropriate visualization of the lateral wall of the notch. A Ramírez and Nephew pinpoint guide was then inserted through the anterolateral portal. Trocar advanced to the  lateral epicondyle and incision made through the skin approximately 3 cm in  length. Trocar was then advanced down to the bony surface. Guide pin was fired  into the knee with the pinpoint guide marking the appropriate  anatomic origin  of the ACL on the lateral wall. Guide was removed. A guide pin was left in place  and incision was carried out through the IT band in a longitudinal fashion  around the guide pin followed by insertion of a 6 mm reamer, progressing up to a 10 mm reamer over the guide pin into the joint. A shaver was then inserted at the edge of the tunnel to chamfer smooth the edge of the tunnel as well as remove bony debris and bone plug was placed through the lateral aspect of the femoral tunnel.      Attention was then turned to the tibial side of the ACL reconstruction with tibial guide set at 55'. The tibial guide was inserted through the far medial portal. Its tip was placed in line with the anterior horn of the lateral meniscus and just lateral to the medial tibial spine.  A 3 cm incision was made over the anteromedial face of the tibia through skin and subcutaneous tissue with 15 blade followed by blunt dissection down to the anterior medial face of the tibia.  Periosteal elevator was used to clear the soft tissue at this point time. A guide pin was then fired into the joint at this point in time; position noted to be appropriate. Tibial guide was removed. Guide pin left in place and a 6 mm reamer followed by a 10 mm reamer passed over the guide pin at this time. The tibial tunnel was thus created with the reamers and edges were chamfered smooth with a shaver at this point. A passing suture was then passed through the tunnels exiting through the distal end of the tibial tunnel. Graft was passed through mineral oil on the back table and lead sutures from the femoral plug were then shuttled with the shuttling suture and passed out through the lateral soft tissue. Traction was then pulled on the lead sutures from the femoral graft and with use of a 90' right angle the graft was maneuvered into the femoral tunnel at this time. Bone plug from the femoral side was pulled through to the lateral cortex of the femur  and once it was visualized a flexible guide pin was placed followed by a 7 x 25 mm interference screw. A good bony bite was noted at this point in time. Traction was pulled across the tibial side of the graft. The graft was noted to be stable at this point. Knee was then cycled from 10' hyperxextension to 130' to reduce the creep in the graft. The scope was then used to visualize the graft, which showed no evidence of anterior, medial or lateral impingement with the knee in full extension and throughout full range of motion.      The knee was then brought to 30' of flexion with posterior drawer applied to the proximal tibia. A flexible guide pin was placed followed by a 9 x 25 interference screw. Secure fixation was noted at this point in time. The scope was reinserted, good endpoint noted on Lachman exam, significant improvement in posterior lateral drawer and good tension seen across the graft with probe exam.      Attention was then turned to closure of the wounds with wound being thoroughly irrigated with normal saline. A bone graft was placed at distal patella and proximal tibia defect sites from patellar tendon harvest. Wounds were then closed with 0 Vicryl for deep layer, 2-0 Vicryl for subcutaneous layer, and skin closure with 3-0 Monocryl in running subcuticular fashion. Wounds were dressed with Steri-Strips, 4 x 4 gauze, ABD pad, Webril, Ace wrap, ice pack, and patient was placed in a hinged knee brace locked in extension.      At the end of the procedure, all lap, needle, and sponge counts were correct x2. The patient had brisk capillary to all digits of the right lower extremity. Compartments were soft and easily compressible at the end of the procedure.      DISPOSITION: The patient was extubated per anesthesia and taken to recovery  room in stable condition. Will be discharged home under the care of his  family. Follow up in 1 week for wound check. We will begin physical therapy on  postoperative day  #3. The patient will be Weight Bear as Tolerated right lower extremity with use of the brace. Will be discharged home with pain  medication and place on 325 mg aspirin daily for 2 weeks for DVT prophylaxis. Results of the procedure were discussed immediately postoperatively with the patient's family. They had all questions answered at this time.

## 2017-04-10 NOTE — PLAN OF CARE
Problem: Patient Care Overview (Adult)  Goal: Plan of Care Review  Outcome: Ongoing (interventions implemented as appropriate)    04/10/17 0944   Coping/Psychosocial Response Interventions   Plan Of Care Reviewed With patient   Patient Care Overview   Progress no change   Outcome Evaluation   Outcome Summary/Follow up Plan Vital signs stable; progressiing towards Phase II       Goal: Adult Individualization and Mutuality  Outcome: Ongoing (interventions implemented as appropriate)    Problem: Perioperative Period (Adult)  Goal: Signs and Symptoms of Listed Potential Problems Will be Absent or Manageable (Perioperative Period)  Outcome: Ongoing (interventions implemented as appropriate)

## 2017-04-10 NOTE — ANESTHESIA PROCEDURE NOTES
Airway  Urgency: elective    Date/Time: 4/10/2017 7:44 AM  End Time:4/10/2017 7:45 AM  Airway not difficult    General Information and Staff    Patient location during procedure: OR  CRNA: OFELIA MALAVE    Indications and Patient Condition    Preoxygenated: yes  MILS maintained throughout  Mask difficulty assessment: 0 - not attempted    Final Airway Details  Final airway type: supraglottic airway      Successful airway: unique  Size 4    Number of attempts at approach: 1    Additional Comments  BEBS, +ETCO2, VSS. TEETH AND GUMS AS PRE-OP.

## 2017-04-13 ENCOUNTER — HOSPITAL ENCOUNTER (OUTPATIENT)
Dept: PHYSICAL THERAPY | Facility: HOSPITAL | Age: 47
Setting detail: THERAPIES SERIES
Discharge: HOME | End: 2017-04-13

## 2017-04-13 DIAGNOSIS — S83.511A RUPTURE OF ANTERIOR CRUCIATE LIGAMENT OF RIGHT KNEE, INITIAL ENCOUNTER: Primary | ICD-10-CM

## 2017-04-13 PROCEDURE — 97161 PT EVAL LOW COMPLEX 20 MIN: CPT | Performed by: PHYSICAL THERAPIST

## 2017-04-14 NOTE — PROGRESS NOTES
Outpatient Physical Therapy Ortho Initial Evaluation   Felisa Khan     Patient Name: Ruperto Michelle  : 1970  MRN: [id number]  Today's Date: 2017      Visit Date: 2017    Patient Active Problem List   Diagnosis   • Rupture of anterior cruciate ligament of right knee        Past Medical History:   Diagnosis Date   • Back pain     LOWER   • High cholesterol    • Right knee pain     SCHEDULED FOR SX        Past Surgical History:   Procedure Laterality Date   • KNEE ACL RECONSTRUCTION Right 4/10/2017    Procedure: KNEE ANTERIOR CRUCIATE LIGAMENT RECONSTRUCTION WITH ALLOGRAFT MEDIDAL FEMORAL CONDYLE CHRONDROPLASTY;  Surgeon: Rudy Gastelum MD;  Location: [facility] OR;  Service:    • VASECTOMY         Visit Dx:     ICD-10-CM ICD-9-CM   1. Rupture of anterior cruciate ligament of right knee, initial encounter S83.511A 844.2             Patient History       17 1000          History    Chief Complaint Difficulty Walking;Difficulty with daily activities;Pain  -GC      Type of Pain Knee pain   right  -GC      Date Current Problem(s) Began 04/10/17  -      Brief Description of Current Complaint Pt originally injured his right knee several months ago. He went through conservative therapy, but continued to have knee instability and difficulty with daily activities. He had an MRI done which showed the ACL rupture. He underwent ACL reconstructive surgery using an allograft on 4/10/2017. He is now referred for therapy.  -GC      Patient/Caregiver Goals Relieve pain;Return to prior level of function;Improve mobility;Improve strength  -GC      Patient's Rating of General Health Good  -GC      Hand Dominance right-handed  -GC      Occupation/sports/leisure activities golf  -      What clinical tests have you had for this problem? MRI  -      Results of Clinical Tests ACL rupture  -      Pain     Pain Location Knee   right  -GC      Pain at Present 0  -GC      Pain at Best 0  -GC      Pain at  Worst 6  -GC      Pain Frequency Intermittent  -GC      Pain Description Aching;Sharp  -GC      What Performance Factors Make the Current Problem(s) WORSE? Pt has pain if he is on it for long periods or if he tries to bend his knee   -GC      What Performance Factors Make the Current Problem(s) BETTER? Pt feels better if he rests with his right leg elevated  -GC      Difficulties at work? Pt is currently off work  -GC      Difficulties with ADL's? Pt has difficulty with don/doff shoes and socks  -GC      Difficulties with recreational activities? Pt is unalbe to participate in recreational activities at this time  -GC      Fall Risk Assessment    Any falls in the past year: Yes  -GC      Number of falls reported in the last 12 months 1  -GC      Factors that contributed to the fall: Tripped   tripped over dog toy  -GC      Does patient have a fear of falling No  -GC      Daily Activities    Primary Language English  -GC      How does patient learn best? Listening  -GC      Teaching needs identified Home Exercise Program;Management of Condition  -GC      Patient is concerned about/has problems with Climbing Stairs;Difficulty with self care (i.e. bathing, dressing, toileting:;Flexibility;Performing home management (household chores, shopping, care of dependents);Performing job responsibilities/community activities (work, school,;Performing sports, recreation, and play activities;Standing;Walking  -GC      Does patient have problems with the following? None  -GC      Barriers to learning None  -GC      Pt Participated in POC and Goals Yes  -GC      Safety    Are you being hurt, hit, or frightened by anyone at home or in your life? No  -GC      Are you being neglected by a caregiver No  -GC        User Key  (r) = Recorded By, (t) = Taken By, (c) = Cosigned By    Initials Name Provider Type    GC Hector Blair PT Physical Therapist                PT Ortho       04/13/17 1000    Posture/Observations     Posture/Observations Comments Pt initially seen in clinic in LROM locked in extension wiht post-op dressing and ACE wrap in place. These were removed and the incional site was clean wiht no drainage and steri strips over all sites. He has moderate effusion and some quad and gastroc atrophy noted.  -GC    Sensation    Light Touch No apparent deficits  -GC    Patellar Accessory Motions    Superior glide Right:;WNL  -GC    Inferior glide Right:;WNL  -GC    Medial glide Right:;WNL  -GC    Lateral glide Right:;WNL  -GC    Knee Special Tests    Raleigh’s sign (DVT) Right:;Negative  -GC    Right Hip    Flexion AROM within functional limits  -GC    Extension AROM within functional limits  -GC    ABduction AROM within functional limits  -GC    ADduction AROM within functional limits  -GC    Right Knee    Extension/Flexion AROM Deficit 0-76 degrees  -GC    Right Ankle    Dorsiflexion AROM within functional limits  -GC    Plantarflexion AROM within functional limits  -GC    Right Hip    Hip Flexion Gross Movement (3+/5) fair plus  -GC    Hip Extension Gross Movement (4/5) good  -GC    Hip ABduction Gross Movement (4/5) good  -GC    Hip ADduction Gross Movement (3+/5) fair plus  -GC    Right Knee    Knee Extension Gross Movement (3/5) fair  -GC    Knee Flexion Gross Movement (3+/5) fair plus  -GC    Right Ankle/Foot    Ankle PF Gross Movement (4/5) good  -GC    Ankle Dorsiflexion Gross Movement (4+/5) good plus  -GC    Lower Extremity Flexibility    Hamstrings Right:;Moderately limited  -GC    Quadriceps Right:;Moderately limited  -GC    Gastrocnemius Right:;Mildly limited  -GC    Transfers    Transfer, Comment Pt is independent with all bed mobility and transfers  -    Gait Assessment/Treatment    Gait, Comment Pt ambulates WBAT right LE sing 2 crutches and wearing LROM brace locked in full extension on right knee  -      User Key  (r) = Recorded By, (t) = Taken By, (c) = Cosigned By    Initials Name Provider Type    RUPERTO Young  YASMINE Blair Physical Therapist                            Therapy Education       04/14/17 0521          Therapy Education    Given HEP;Symptoms/condition management;Pain management;Edema management  -GC      Program New  -GC      How Provided Verbal;Demonstration  -GC      Provided to Patient;Caregiver  -GC      Level of Understanding Teach back education performed;Verbalized;Demonstrated  -GC        User Key  (r) = Recorded By, (t) = Taken By, (c) = Cosigned By    Initials Name Provider Type    RUPERTO Blair PT Physical Therapist                PT OP Goals       04/13/17 1000       PT Short Term Goals    STG Date to Achieve 05/11/17  -GC     STG 1 Decrease right knee pain with activity to 3-4/10.  -GC     STG 2 Increase AROM of right knee to 0-110 degrees with testing.  -GC     STG 3 Increase right LE strength to at least 4/5 all planes with testing.  -GC     STG 4 Decrease effusion right knee to WFL with testing.  -GC     STG 5 Pt will be independent with his HEP issued by this therapist.  -GC     Long Term Goals    LTG Date to Achieve 06/08/17  -GC     LTG 1 Decrease right knee pain with activity to 0-1/10.  -GC     LTG 2 Increase AROM right knee to 0-130 degrees with testing.  -GC     LTG 3 Increase right LE strength to 5/5 all planes with testing.  -GC     LTG 4 Restore normal gait on levels and stairs without assistive device.  -GC     LTG 5 Pt will be indpendent with all ADLs and have a LEFS score > 65.  -     Time Calculation    PT Goal Re-Cert Due Date 05/11/17  -       User Key  (r) = Recorded By, (t) = Taken By, (c) = Cosigned By    Initials Name Provider Type    RUPERTO Blair PT Physical Therapist                PT Assessment/Plan       04/13/17 1000       PT Assessment    Functional Limitations Impaired gait;Limitation in home management;Limitations in community activities;Limitations in functional capacity and performance;Performance in leisure activities;Performance in self-care  ADL;Performance in sport activities;Performance in work activities  -GC     Impairments Edema;Gait;Impaired flexibility;Range of motion;Pain;Muscle strength  -GC     Assessment Comments Pt presents approximately 4 days s/p right knee ACL reconstructive surgery. He has pain rated up to 6/10 with activity. He has moderate right knee effusion, decreased right knee ROM, decreased right LE strength, decreased ambulatory status, and decreased function secondary to the above.  -GC     Rehab Potential Good  -GC     Patient/caregiver participated in establishment of treatment plan and goals Yes  -GC     Patient would benefit from skilled therapy intervention Yes  -GC     PT Plan    PT Frequency 2x/week;3x/week  -GC     Predicted Duration of Therapy Intervention (days/wks) 8 weeks  -     Planned CPT's? PT EVAL LOW COMPLEXITY: 20432;PT THER PROC EA 15 MIN: 13021;PT ELECTRICAL STIM UNATTEND: ;PT HOT OR COLD PACK TREAT MCARE  -     PT Plan Comments Pt is to continue his HEP daily  -       User Key  (r) = Recorded By, (t) = Taken By, (c) = Cosigned By    Initials Name Provider Type     Hector Blair, PT Physical Therapist                Modalities       04/13/17 1000          Ice    Ice Applied Yes  -GC      Location right knee  -GC      Rx Minutes 15 mins   with IFC  -GC      Ice S/P Rx Yes  -GC      ELECTRICAL STIMULATION    Attended/Unattended Unattended  -      Stimulation Type IFC  -GC      Location/Electrode Placement/Other right knee  -GC      Rx Minutes 15 mins  -        User Key  (r) = Recorded By, (t) = Taken By, (c) = Cosigned By    Initials Name Provider Type     Hector Blair, PT Physical Therapist              Exercises       04/13/17 1000          Exercise 1    Exercise Name 1 Heel Slides  -      Time (Minutes) 1 7 min  -GC      Exercise 2    Exercise Name 2 Wall Slides  -GC      Exercise 3    Exercise Name 3 Hamstring stretch  -GC      Reps 3 15  -GC      Time (Seconds) 3 10 secs  -GC       Exercise 4    Exercise Name 4 Supine knee extension stretch  -GC      Time (Minutes) 4 5 min  -GC      Exercise 5    Exercise Name 5 QS with Russian Stim  -GC      Time (Minutes) 5 10 min 10/10  -GC      Exercise 6    Exercise Name 6 SLR  -GC      Reps 6 25  -GC      Exercise 7    Exercise Name 7 Hip ABD  -GC      Reps 7 25  -GC      Exercise 8    Exercise Name 8 Hip ADD  -GC      Reps 8 25  -GC      Exercise 9    Exercise Name 9 Hip Extension  -GC      Reps 9 25  -GC      Exercise 10    Exercise Name 10 PF vs theraband  -GC      Equipment 10 Theraband  -GC      Resistance 10 Other (comment)   silver  -GC      Reps 10 25  -GC        User Key  (r) = Recorded By, (t) = Taken By, (c) = Cosigned By    Initials Name Provider Type     Hector Blair PT Physical Therapist                              Outcome Measures       04/13/17 1000          Lower Extremity Functional Index    Any of your usual work, housework or school activities 0  -GC      Your usual hobbies, recreational or sporting activities 0  -GC      Getting into or out of the bath 0  -GC      Walking between rooms 1  -GC      Putting on your shoes or socks 0  -GC      Squatting 0  -GC      Lifting an object, like a bag of groceries from the floor 0  -GC      Performing light activities around your home 0  -GC      Performing heavy activities around your home 0  -GC      Getting into or out of a car 1  -GC      Walking 2 blocks 0  -GC      Walking a mile 0  -GC      Going up or down 10 stairs (about 1 flight of stairs) 0  -GC      Standing for 1 hour 0  -GC      Sitting for 1 hour 3  -GC      Running on even ground 0  -GC      Running on uneven ground 0  -GC      Making sharp turns while running fast 0  -GC      Hopping 0  -GC      Rolling over in bed 1  -GC      Total 6  -GC        User Key  (r) = Recorded By, (t) = Taken By, (c) = Cosigned By    Initials Name Provider Type    RUPERTO Blair PT Physical Therapist            Time Calculation:   Start  Time: 1000  Stop Time: 1120  Time Calculation (min): 80 min     Therapy Charges for Today     Code Description Service Date Service Provider Modifiers Qty    [id number] HC PT EVAL LOW COMPLEXITY 4 4/13/2017 Hector Blair, PT GP 1                    Hector Blair, PT  4/14/2017

## 2017-04-17 ENCOUNTER — HOSPITAL ENCOUNTER (OUTPATIENT)
Dept: PHYSICAL THERAPY | Facility: HOSPITAL | Age: 47
Setting detail: THERAPIES SERIES
Discharge: HOME | End: 2017-04-17

## 2017-04-17 DIAGNOSIS — S83.511A RUPTURE OF ANTERIOR CRUCIATE LIGAMENT OF RIGHT KNEE, INITIAL ENCOUNTER: Primary | ICD-10-CM

## 2017-04-17 PROCEDURE — G0283 ELEC STIM OTHER THAN WOUND: HCPCS | Performed by: PHYSICAL THERAPIST

## 2017-04-17 PROCEDURE — 97110 THERAPEUTIC EXERCISES: CPT | Performed by: PHYSICAL THERAPIST

## 2017-04-17 NOTE — PROGRESS NOTES
Outpatient Physical Therapy Ortho Treatment Note   Felisa Khan     Patient Name: Ruperto Michelle  : 1970  MRN: [id number]  Today's Date: 2017      Visit Date: 2017    Visit Dx:    ICD-10-CM ICD-9-CM   1. Rupture of anterior cruciate ligament of right knee, initial encounter S83.511A 844.2       Patient Active Problem List   Diagnosis   • Rupture of anterior cruciate ligament of right knee        Past Medical History:   Diagnosis Date   • Back pain     LOWER   • High cholesterol    • Right knee pain     SCHEDULED FOR SX        Past Surgical History:   Procedure Laterality Date   • KNEE ACL RECONSTRUCTION Right 4/10/2017    Procedure: KNEE ANTERIOR CRUCIATE LIGAMENT RECONSTRUCTION WITH ALLOGRAFT MEDIDAL FEMORAL CONDYLE CHRONDROPLASTY;  Surgeon: Rudy Gastelum MD;  Location: [facility];  Service:    • VASECTOMY               PT Ortho       17 1155    Subjective Comments    Subjective Comments Pt states his knee has swelled a little, but he has been on his feet for longer periods.  -      User Key  (r) = Recorded By, (t) = Taken By, (c) = Cosigned By    Initials Name Provider Type    RUPERTO Blair, PT Physical Therapist                            PT Assessment/Plan       17 1155       PT Assessment    Assessment Comments Pt is doing well with increased knee ROMnoted with his stretches and good tolerance to increased exercises.  -     PT Plan    PT Plan Comments Pt is to continue his HEP daily.  -       User Key  (r) = Recorded By, (t) = Taken By, (c) = Cosigned By    Initials Name Provider Type    RUPERTO Blair, PT Physical Therapist                Modalities       17 1155          Ice    Ice Applied Yes  -GC      Location right knee  -GC      Rx Minutes 15 mins   with IFC  -GC      Ice S/P Rx Yes  -GC      ELECTRICAL STIMULATION    Attended/Unattended Unattended  -GC      Stimulation Type IFC  -GC      Location/Electrode Placement/Other right knee  -GC      Rx  Minutes 15 mins  -GC        User Key  (r) = Recorded By, (t) = Taken By, (c) = Cosigned By    Initials Name Provider Type     Hector Blair PT Physical Therapist                Exercises       04/17/17 1155          Subjective Comments    Subjective Comments Pt states his knee has swelled a little, but he has been on his feet for longer periods.  -GC      Exercise 1    Exercise Name 1 Heel Slides  -GC      Time (Minutes) 1 10 min  -GC      Exercise 2    Exercise Name 2 Wall Slides  -GC      Time (Minutes) 2 5 min  -GC      Exercise 3    Exercise Name 3 Hamstring stretch  -GC      Reps 3 15  -GC      Time (Seconds) 3 10 secs  -GC      Exercise 4    Exercise Name 4 Prone leg hang  -GC      Time (Minutes) 4 5 min  -GC      Exercise 5    Exercise Name 5 QS with Russian Stim  -GC      Time (Minutes) 5 10 min 10/10  -GC      Exercise 6    Exercise Name 6 SLR  -GC      Reps 6 25  -GC      Exercise 7    Exercise Name 7 Hip ABD  -GC      Reps 7 25  -GC      Exercise 8    Exercise Name 8 Hip ADD  -GC      Reps 8 25  -GC      Exercise 9    Exercise Name 9 Hip Extension  -GC      Reps 9 25  -GC      Exercise 10    Exercise Name 10 Heel Raises  -GC      Reps 10 25  -GC      Exercise 11    Exercise Name 11 TKE vs theraband  -GC      Equipment 11 Theraband  -GC      Resistance 11 Other (comment)   gold  -GC      Reps 11 25  -GC        User Key  (r) = Recorded By, (t) = Taken By, (c) = Cosigned By    Initials Name Provider Type     Hector Blair PT Physical Therapist                                       Time Calculation:   Start Time: 1155  Stop Time: 1317  Time Calculation (min): 82 min    Therapy Charges for Today     Code Description Service Date Service Provider Modifiers Qty    [id number]  PT THER PROC EA 15 MIN 4/17/2017 Hector Blair, PT GP 2    62011045858 HC PT ELECTRICAL STIM UNATTENDED 4/17/2017 Hector Blair PT  1                    Hector Blair PT  4/17/2017

## 2017-04-18 ENCOUNTER — OFFICE VISIT (OUTPATIENT)
Dept: ORTHOPEDIC SURGERY | Facility: CLINIC | Age: 47
End: 2017-04-18

## 2017-04-18 DIAGNOSIS — Z98.890 STATUS POST REPAIR OF ANTERIOR CRUCIATE LIGAMENT: Primary | ICD-10-CM

## 2017-04-18 PROCEDURE — 99024 POSTOP FOLLOW-UP VISIT: CPT | Performed by: NURSE PRACTITIONER

## 2017-04-18 NOTE — PROGRESS NOTES
CC:Status post right knee ACL reconstruction with allograft, medial femoral condyle chondroplasty, DOS 04/10/2017    Interval history: Patient returns to clinic today, 1 week from surgery, doing well with physical therapy in regards to strengthening, range of motion, and ambulation.  Denies any locking, catching, or giving way of right knee.  Has continued to wear the brace as instructed.  Denies any numbness, tingling, or issues with incisions over the knee.    Physical exam:              Right knee:   Incisions- clean dry, and intact, healing well   Effusion: Mild   ROM- 2- 90 degrees   Strength-  4/5   Positive sensation light touch    Brisk cap refill   No calf pain, negative Raleigh's sign bilateral lower extremities    Impression: Status post right knee ACL reconstruction with allograft, medial femoral condyle chondroplasty    Plan:  1.  Continue with physical therapy 3 times per week for work on range of motion and strengthening.  2.  Keep hinge knee brace locked during any weightbearing activities, may unlock for PT and while seated or supine.  3. Will wean off crutches as gait pattern normalizes under the direction of physical therapist  4.  Will follow-up in 3 weeks for reevaluation of motion and strength and xrays.

## 2017-04-19 ENCOUNTER — HOSPITAL ENCOUNTER (OUTPATIENT)
Dept: PHYSICAL THERAPY | Facility: HOSPITAL | Age: 47
Setting detail: THERAPIES SERIES
Discharge: HOME | End: 2017-04-19

## 2017-04-19 DIAGNOSIS — S83.511A RUPTURE OF ANTERIOR CRUCIATE LIGAMENT OF RIGHT KNEE, INITIAL ENCOUNTER: Primary | ICD-10-CM

## 2017-04-19 PROCEDURE — G0283 ELEC STIM OTHER THAN WOUND: HCPCS | Performed by: PHYSICAL THERAPIST

## 2017-04-19 PROCEDURE — 97110 THERAPEUTIC EXERCISES: CPT | Performed by: PHYSICAL THERAPIST

## 2017-04-19 NOTE — PROGRESS NOTES
Outpatient Physical Therapy Ortho Treatment Note   Felisa Khan     Patient Name: Ruperto Michelle  : 1970  MRN: [id number]  Today's Date: 2017      Visit Date: 2017    Visit Dx:    ICD-10-CM ICD-9-CM   1. Rupture of anterior cruciate ligament of right knee, initial encounter S83.511A 844.2       Patient Active Problem List   Diagnosis   • Rupture of anterior cruciate ligament of right knee   • Status post repair of anterior cruciate ligament, right        Past Medical History:   Diagnosis Date   • Back pain     LOWER   • High cholesterol    • Right knee pain     SCHEDULED FOR SX        Past Surgical History:   Procedure Laterality Date   • KNEE ACL RECONSTRUCTION Right 4/10/2017    Procedure: KNEE ANTERIOR CRUCIATE LIGAMENT RECONSTRUCTION WITH ALLOGRAFT MEDIDAL FEMORAL CONDYLE CHRONDROPLASTY;  Surgeon: Rudy Gastelum MD;  Location: [facility];  Service:    • VASECTOMY               PT Ortho       17 1150    Subjective Comments    Subjective Comments Pt states he is doing well and has no sifnificant pain.  -    Subjective Pain    Able to rate subjective pain? yes  -    Pre-Treatment Pain Level 1  -    Right Knee    Extension/Flexion AROM Deficit 0-120 degrees after stretching  -      17 1155    Subjective Comments    Subjective Comments Pt states his knee has swelled a little, but he has been on his feet for longer periods.  -      User Key  (r) = Recorded By, (t) = Taken By, (c) = Cosigned By    Initials Name Provider Type    RUPERTO Blair PT Physical Therapist                            PT Assessment/Plan       17 1150       PT Assessment    Assessment Comments Pt is doing very well with good ROM and good tolerance to increased exercises.  -     PT Plan    PT Plan Comments Pt is to continue his HEP daily.  -       User Key  (r) = Recorded By, (t) = Taken By, (c) = Cosigned By    Initials Name Provider Type    RUPERTO Blair PT Physical Therapist                 Modalities       04/19/17 1150          Ice    Ice Applied Yes  -GC      Location right knee  -GC      Rx Minutes 15 mins   with IFC  -GC      Ice S/P Rx Yes  -GC      ELECTRICAL STIMULATION    Attended/Unattended Unattended  -GC      Stimulation Type IFC  -GC      Location/Electrode Placement/Other right knee  -GC      Rx Minutes 15 mins  -GC        User Key  (r) = Recorded By, (t) = Taken By, (c) = Cosigned By    Initials Name Provider Type    GC Hector Blair, PT Physical Therapist                Exercises       04/19/17 1150          Subjective Comments    Subjective Comments Pt states he is doing well and has no sifnificant pain.  -GC      Subjective Pain    Able to rate subjective pain? yes  -GC      Pre-Treatment Pain Level 1  -GC      Exercise 1    Exercise Name 1 Heel Slides  -GC      Time (Minutes) 1 10 min  -GC      Exercise 2    Exercise Name 2 Wall Slides  -GC      Time (Minutes) 2 5 min  -GC      Exercise 3    Exercise Name 3 Hamstring stretch  -GC      Reps 3 15  -GC      Time (Seconds) 3 10 secs  -GC      Exercise 4    Exercise Name 4 Prone leg hang  -GC      Time (Minutes) 4 5 min  -GC      Exercise 5    Exercise Name 5 QS with Russian Stim  -GC      Time (Minutes) 5 10 min 10/10  -GC      Exercise 6    Exercise Name 6 SLR  -GC      Equipment 6 Cuff Weight  -GC      Weights/Plates 6 1  -GC      Reps 6 25  -GC      Exercise 7    Exercise Name 7 Hip ABD  -GC      Equipment 7 Cuff Weight  -GC      Weights/Plates 7 1  -GC      Reps 7 25  -GC      Exercise 8    Exercise Name 8 Hip ADD  -GC      Equipment 8 Cuff Weight  -GC      Weights/Plates 8 1  -GC      Reps 8 25  -GC      Exercise 9    Exercise Name 9 Hip Extension  -GC      Equipment 9 Cuff Weight  -GC      Weights/Plates 9 1  -GC      Reps 9 25  -GC      Exercise 10    Exercise Name 10 Heel Raises  -GC      Reps 10 25  -GC      Exercise 11    Exercise Name 11 TKE vs theraband  -GC      Equipment 11 Theraband  -GC      Resistance 11  "Other (comment)   gold  -GC      Reps 11 25  -GC      Exercise 12    Exercise Name 12 Forward step ups  -GC      Reps 12 20   4\" step  -GC      Exercise 13    Exercise Name 13 Lateral step overs  -GC      Reps 13 20   4\" step  -GC        User Key  (r) = Recorded By, (t) = Taken By, (c) = Cosigned By    Initials Name Provider Type     Hector Blair, PT Physical Therapist                                       Time Calculation:   Start Time: 1150  Stop Time: 1314  Time Calculation (min): 84 min    Therapy Charges for Today     Code Description Service Date Service Provider Modifiers Qty    24607658252  PT THER PROC EA 15 MIN 4/19/2017 Hector Blair, PT GP 2    [id number]  PT ELECTRICAL STIM UNATTENDED 4/19/2017 Hector Blair, PT  1                    Hector Blair, PT  4/19/2017     "

## 2017-04-21 ENCOUNTER — HOSPITAL ENCOUNTER (OUTPATIENT)
Dept: PHYSICAL THERAPY | Facility: HOSPITAL | Age: 47
Setting detail: THERAPIES SERIES
Discharge: HOME | End: 2017-04-21

## 2017-04-21 DIAGNOSIS — S83.511A RUPTURE OF ANTERIOR CRUCIATE LIGAMENT OF RIGHT KNEE, INITIAL ENCOUNTER: Primary | ICD-10-CM

## 2017-04-21 PROCEDURE — G0283 ELEC STIM OTHER THAN WOUND: HCPCS | Performed by: PHYSICAL THERAPIST

## 2017-04-21 PROCEDURE — 97110 THERAPEUTIC EXERCISES: CPT | Performed by: PHYSICAL THERAPIST

## 2017-04-21 NOTE — PROGRESS NOTES
Outpatient Physical Therapy Ortho Treatment Note   Felisa Khan     Patient Name: Ruperto Michelle  : 1970  MRN: [id number]  Today's Date: 2017      Visit Date: 2017    Visit Dx:    ICD-10-CM ICD-9-CM   1. Rupture of anterior cruciate ligament of right knee, initial encounter S83.511A 844.2       Patient Active Problem List   Diagnosis   • Rupture of anterior cruciate ligament of right knee   • Status post repair of anterior cruciate ligament, right        Past Medical History:   Diagnosis Date   • Back pain     LOWER   • High cholesterol    • Right knee pain     SCHEDULED FOR SX        Past Surgical History:   Procedure Laterality Date   • KNEE ACL RECONSTRUCTION Right 4/10/2017    Procedure: KNEE ANTERIOR CRUCIATE LIGAMENT RECONSTRUCTION WITH ALLOGRAFT MEDIDAL FEMORAL CONDYLE CHRONDROPLASTY;  Surgeon: Rudy Gastelum MD;  Location: [facility];  Service:    • VASECTOMY               PT Ortho       17 1200    Right Knee    Extension/Flexion AROM Deficit 0-122 degrees after stretching  -GC      17 1150    Subjective Comments    Subjective Comments Pt states he is doing well and has no sifnificant pain.  -    Subjective Pain    Able to rate subjective pain? yes  -    Pre-Treatment Pain Level 1  -    Right Knee    Extension/Flexion AROM Deficit 0-120 degrees after stretching  -      User Key  (r) = Recorded By, (t) = Taken By, (c) = Cosigned By    Initials Name Provider Type    RUPERTO Blair PT Physical Therapist                            PT Assessment/Plan       17 1200       PT Assessment    Assessment Comments Pt is doing well with good ROM and improving function.  -     PT Plan    PT Plan Comments Pt is to continue his HEP daily wiht therapy 2x weekly.  -       User Key  (r) = Recorded By, (t) = Taken By, (c) = Cosigned By    Initials Name Provider Type    RUPERTO Blair PT Physical Therapist                Modalities       17 1200          Ice     "Ice Applied Yes  -GC      Location right knee  -GC      Rx Minutes 15 mins   with IFC  -GC      Ice S/P Rx Yes  -GC      ELECTRICAL STIMULATION    Attended/Unattended Unattended  -GC      Stimulation Type IFC  -GC      Location/Electrode Placement/Other right knee  -GC      Rx Minutes 15 mins  -GC        User Key  (r) = Recorded By, (t) = Taken By, (c) = Cosigned By    Initials Name Provider Type    GC Hector Blair, YASMINE Physical Therapist                Exercises       04/21/17 1200          Subjective Comments    Subjective Comments Pt states his knee is a little stiff today.  -GC      Exercise 1    Exercise Name 1 Heel Slides  -GC      Time (Minutes) 1 10 min  -GC      Exercise 2    Exercise Name 2 Wall Slides  -GC      Time (Minutes) 2 10 min  -GC      Exercise 3    Exercise Name 3 Hamstring stretch  -GC      Reps 3 15  -GC      Time (Seconds) 3 10 secs  -GC      Exercise 4    Exercise Name 4 Prone leg hang  -GC      Time (Minutes) 4 5 min  -GC      Exercise 5    Exercise Name 5 QS with Russian Stim  -GC      Time (Minutes) 5 10 min 10/10  -GC      Exercise 6    Exercise Name 6 SLR  -GC      Equipment 6 Cuff Weight  -GC      Weights/Plates 6 1  -GC      Reps 6 Other   40  -GC      Exercise 7    Exercise Name 7 Hip ABD  -GC      Equipment 7 Cuff Weight  -GC      Weights/Plates 7 1  -GC      Reps 7 Other   40  -GC      Exercise 8    Exercise Name 8 Hip ADD  -GC      Equipment 8 Cuff Weight  -GC      Weights/Plates 8 1  -GC      Reps 8 Other   40  -GC      Exercise 9    Exercise Name 9 Hip Extension  -GC      Equipment 9 Cuff Weight  -GC      Weights/Plates 9 1  -GC      Reps 9 Other   40  -GC      Exercise 10    Exercise Name 10 Heel Raises  -GC      Reps 10 25  -GC      Exercise 11    Exercise Name 11 TKE vs theraband  -GC      Equipment 11 Theraband  -GC      Resistance 11 Other (comment)   gold  -GC      Reps 11 25  -GC      Exercise 12    Exercise Name 12 Forward step ups  -GC      Reps 12 20   4\" step  -GC   " "   Exercise 13    Exercise Name 13 Lateral step overs  -GC      Reps 13 20   4\" step  -GC      Exercise 14    Exercise Name 14 Standing hamstring curls  -      Equipment 14 --  -GC      Weights/Plates 14 --  -GC      Reps 14 25  -GC      Exercise 15    Exercise Name 15 LAQ  -GC      Reps 15 25  -GC      Exercise 16    Exercise Name 16 Mini squats  -      Reps 16 25  -GC        User Key  (r) = Recorded By, (t) = Taken By, (c) = Cosigned By    Initials Name Provider Type     Hector Blair, PT Physical Therapist                                       Time Calculation:   Start Time: 1200  Stop Time: 1315  Time Calculation (min): 75 min    Therapy Charges for Today     Code Description Service Date Service Provider Modifiers Qty    17822343550 HC PT THER PROC EA 15 MIN 4/21/2017 Hector Blair, PT GP 2    [id number]  PT ELECTRICAL STIM UNATTENDED 4/21/2017 Hector Blair, PT  1                    Hector Blair, PT  4/21/2017     "

## 2017-04-24 ENCOUNTER — HOSPITAL ENCOUNTER (OUTPATIENT)
Dept: PHYSICAL THERAPY | Facility: HOSPITAL | Age: 47
Setting detail: THERAPIES SERIES
Discharge: HOME | End: 2017-04-24

## 2017-04-24 DIAGNOSIS — S83.511D RUPTURE OF ANTERIOR CRUCIATE LIGAMENT OF RIGHT KNEE, SUBSEQUENT ENCOUNTER: Primary | ICD-10-CM

## 2017-04-24 PROCEDURE — G0283 ELEC STIM OTHER THAN WOUND: HCPCS | Performed by: PHYSICAL THERAPIST

## 2017-04-24 PROCEDURE — 97110 THERAPEUTIC EXERCISES: CPT | Performed by: PHYSICAL THERAPIST

## 2017-04-24 NOTE — PROGRESS NOTES
Outpatient Physical Therapy Ortho Treatment Note   Felisa Khan     Patient Name: Ruperto Michelle  : 1970  MRN: [id number]  Today's Date: 2017      Visit Date: 2017    Visit Dx:    ICD-10-CM ICD-9-CM   1. Rupture of anterior cruciate ligament of right knee, subsequent encounter S83.511D 844.2       Patient Active Problem List   Diagnosis   • Rupture of anterior cruciate ligament of right knee   • Status post repair of anterior cruciate ligament, right        Past Medical History:   Diagnosis Date   • Back pain     LOWER   • High cholesterol    • Right knee pain     SCHEDULED FOR SX        Past Surgical History:   Procedure Laterality Date   • KNEE ACL RECONSTRUCTION Right 4/10/2017    Procedure: KNEE ANTERIOR CRUCIATE LIGAMENT RECONSTRUCTION WITH ALLOGRAFT MEDIDAL FEMORAL CONDYLE CHRONDROPLASTY;  Surgeon: Rudy Gastelum MD;  Location: [facility];  Service:    • VASECTOMY               PT Ortho       17 1155    Subjective Comments    Subjective Comments Pt states his knee just feels real stiff every time he starts his exercises.  -    Right Knee    Extension/Flexion AROM Deficit 0-126 degrees after stretching  -      User Key  (r) = Recorded By, (t) = Taken By, (c) = Cosigned By    Initials Name Provider Type    RUPERTO Blair, PT Physical Therapist                            PT Assessment/Plan       17 1155       PT Assessment    Assessment Comments Pt is doing well with increased knee ROM and good tolerance to increased exercises.  -     PT Plan    PT Plan Comments Pt is to continue his HEP daily.  -       User Key  (r) = Recorded By, (t) = Taken By, (c) = Cosigned By    Initials Name Provider Type    RUPERTO Blair, PT Physical Therapist                Modalities       17 1155          Ice    Ice Applied Yes  -GC      Location right knee  -GC      Rx Minutes 15 mins   with IFC  -GC      Ice S/P Rx Yes  -GC      ELECTRICAL STIMULATION    Attended/Unattended  "Unattended  -GC      Stimulation Type IFC  -GC      Location/Electrode Placement/Other right knee  -GC      Rx Minutes 15 mins  -GC        User Key  (r) = Recorded By, (t) = Taken By, (c) = Cosigned By    Initials Name Provider Type    GC Hector Blair, PT Physical Therapist                Exercises       04/24/17 1155          Subjective Comments    Subjective Comments Pt states his knee just feels real stiff every time he starts his exercises.  -GC      Exercise 1    Exercise Name 1 Heel Slides  -GC      Time (Minutes) 1 10 min  -GC      Exercise 2    Exercise Name 2 Wall Slides  -GC      Time (Minutes) 2 10 min  -GC      Exercise 3    Exercise Name 3 Hamstring stretch  -GC      Reps 3 15  -GC      Time (Seconds) 3 10 secs  -GC      Exercise 4    Exercise Name 4 Prone leg hang  -GC      Time (Minutes) 4 5 min  -GC      Exercise 5    Exercise Name 5 QS with Russian Stim  -GC      Time (Minutes) 5 10 min 10/10  -GC      Exercise 6    Exercise Name 6 SLR  -GC      Equipment 6 Cuff Weight  -GC      Weights/Plates 6 2  -GC      Reps 6 25  -GC      Exercise 7    Exercise Name 7 Hip ABD  -GC      Equipment 7 Cuff Weight  -GC      Weights/Plates 7 2  -GC      Reps 7 25  -GC      Exercise 8    Exercise Name 8 Hip ADD  -GC      Equipment 8 Cuff Weight  -GC      Weights/Plates 8 2  -GC      Reps 8 25  -GC      Exercise 9    Exercise Name 9 Hip Extension  -GC      Equipment 9 Cuff Weight  -GC      Weights/Plates 9 2  -GC      Reps 9 25  -GC      Exercise 10    Exercise Name 10 Heel Raises  -GC      Reps 10 25  -GC      Exercise 11    Exercise Name 11 TKE vs theraband  -GC      Equipment 11 Theraband  -GC      Resistance 11 Other (comment)   gold  -GC      Reps 11 25  -GC      Exercise 12    Exercise Name 12 Forward step ups  -GC      Reps 12 20   6\" step  -GC      Exercise 13    Exercise Name 13 Lateral step overs  -GC      Reps 13 20   6\" step  -GC      Exercise 14    Exercise Name 14 Standing hamstring curls  -GC      " Equipment 14 Cuff Weight  -      Weights/Plates 14 2  -GC      Reps 14 25  -GC      Exercise 15    Exercise Name 15 LAQ  -GC      Reps 15 25  -GC      Exercise 16    Exercise Name 16 Mini squats  -GC      Reps 16 25  -GC        User Key  (r) = Recorded By, (t) = Taken By, (c) = Cosigned By    Initials Name Provider Type     Hector Blair, PT Physical Therapist                                       Time Calculation:   Start Time: 1155  Stop Time: 1307  Time Calculation (min): 72 min    Therapy Charges for Today     Code Description Service Date Service Provider Modifiers Qty    [id number]  PT THER PROC EA 15 MIN 4/24/2017 Hector Blair, PT GP 2    [id number] HC PT ELECTRICAL STIM UNATTENDED 4/24/2017 Hector Blair, PT  1                    Hector Blair, PT  4/24/2017

## 2017-04-26 ENCOUNTER — TELEPHONE (OUTPATIENT)
Dept: ORTHOPEDIC SURGERY | Facility: CLINIC | Age: 47
End: 2017-04-26

## 2017-04-26 RX ORDER — MELOXICAM 7.5 MG/1
7.5 TABLET ORAL DAILY
Qty: 30 TABLET | Refills: 0 | Status: SHIPPED | OUTPATIENT
Start: 2017-04-26 | End: 2017-05-09

## 2017-04-26 NOTE — TELEPHONE ENCOUNTER
"Patient is experiencing pain up in his thigh, about 4\" above his knee.  Says that Hector in PT believes it might be IT band.  It is throbbing and worse at night.  Wondering if you have any suggestions.    Told him I would call him back ASA but he said if you would like to call him that would be fine, he already has your cell number.    "

## 2017-04-26 NOTE — TELEPHONE ENCOUNTER
Discussed treatment options with patient this time.  His pain does seem to be originating from his IT band.  I've also talked to Hector Blair physical therapy and he does confirm this as well.  We will start him on some meloxicam for 7-10 days, he can use it up to 2 weeks for improvement in his symptoms.  Also recommended soft tissue massage as tolerated.  Told to call back if he has any further issues.

## 2017-04-28 ENCOUNTER — HOSPITAL ENCOUNTER (OUTPATIENT)
Dept: PHYSICAL THERAPY | Facility: HOSPITAL | Age: 47
Setting detail: THERAPIES SERIES
Discharge: HOME | End: 2017-04-28

## 2017-04-28 DIAGNOSIS — S83.511D RUPTURE OF ANTERIOR CRUCIATE LIGAMENT OF RIGHT KNEE, SUBSEQUENT ENCOUNTER: Primary | ICD-10-CM

## 2017-04-28 PROCEDURE — 97110 THERAPEUTIC EXERCISES: CPT | Performed by: PHYSICAL THERAPIST

## 2017-04-28 PROCEDURE — G0283 ELEC STIM OTHER THAN WOUND: HCPCS | Performed by: PHYSICAL THERAPIST

## 2017-05-03 ENCOUNTER — HOSPITAL ENCOUNTER (OUTPATIENT)
Dept: PHYSICAL THERAPY | Facility: HOSPITAL | Age: 47
Setting detail: THERAPIES SERIES
Discharge: HOME | End: 2017-05-03

## 2017-05-03 DIAGNOSIS — S83.511D RUPTURE OF ANTERIOR CRUCIATE LIGAMENT OF RIGHT KNEE, SUBSEQUENT ENCOUNTER: Primary | ICD-10-CM

## 2017-05-03 PROCEDURE — G0283 ELEC STIM OTHER THAN WOUND: HCPCS | Performed by: PHYSICAL THERAPIST

## 2017-05-03 PROCEDURE — 97110 THERAPEUTIC EXERCISES: CPT | Performed by: PHYSICAL THERAPIST

## 2017-05-08 ENCOUNTER — HOSPITAL ENCOUNTER (OUTPATIENT)
Dept: PHYSICAL THERAPY | Facility: HOSPITAL | Age: 47
Setting detail: THERAPIES SERIES
Discharge: HOME | End: 2017-05-08

## 2017-05-08 DIAGNOSIS — S83.511D RUPTURE OF ANTERIOR CRUCIATE LIGAMENT OF RIGHT KNEE, SUBSEQUENT ENCOUNTER: Primary | ICD-10-CM

## 2017-05-08 PROCEDURE — 97110 THERAPEUTIC EXERCISES: CPT | Performed by: PHYSICAL THERAPIST

## 2017-05-09 ENCOUNTER — OFFICE VISIT (OUTPATIENT)
Dept: ORTHOPEDIC SURGERY | Facility: CLINIC | Age: 47
End: 2017-05-09

## 2017-05-09 DIAGNOSIS — R52 PAIN: Primary | ICD-10-CM

## 2017-05-09 DIAGNOSIS — Z98.890 STATUS POST REPAIR OF ANTERIOR CRUCIATE LIGAMENT: ICD-10-CM

## 2017-05-09 PROCEDURE — 73562 X-RAY EXAM OF KNEE 3: CPT | Performed by: ORTHOPAEDIC SURGERY

## 2017-05-09 PROCEDURE — 99024 POSTOP FOLLOW-UP VISIT: CPT | Performed by: ORTHOPAEDIC SURGERY

## 2017-05-12 ENCOUNTER — HOSPITAL ENCOUNTER (OUTPATIENT)
Dept: PHYSICAL THERAPY | Facility: HOSPITAL | Age: 47
Setting detail: THERAPIES SERIES
Discharge: HOME | End: 2017-05-12

## 2017-05-12 DIAGNOSIS — S83.511D RUPTURE OF ANTERIOR CRUCIATE LIGAMENT OF RIGHT KNEE, SUBSEQUENT ENCOUNTER: Primary | ICD-10-CM

## 2017-05-12 PROCEDURE — 97110 THERAPEUTIC EXERCISES: CPT | Performed by: PHYSICAL THERAPIST

## 2017-05-19 ENCOUNTER — HOSPITAL ENCOUNTER (OUTPATIENT)
Dept: PHYSICAL THERAPY | Facility: HOSPITAL | Age: 47
Setting detail: THERAPIES SERIES
Discharge: HOME | End: 2017-05-19

## 2017-05-19 DIAGNOSIS — S83.511D RUPTURE OF ANTERIOR CRUCIATE LIGAMENT OF RIGHT KNEE, SUBSEQUENT ENCOUNTER: Primary | ICD-10-CM

## 2017-05-19 PROCEDURE — 97110 THERAPEUTIC EXERCISES: CPT | Performed by: PHYSICAL THERAPIST

## 2017-05-26 ENCOUNTER — HOSPITAL ENCOUNTER (OUTPATIENT)
Dept: PHYSICAL THERAPY | Facility: HOSPITAL | Age: 47
Setting detail: THERAPIES SERIES
Discharge: HOME | End: 2017-05-26

## 2017-05-26 DIAGNOSIS — S83.511D RUPTURE OF ANTERIOR CRUCIATE LIGAMENT OF RIGHT KNEE, SUBSEQUENT ENCOUNTER: Primary | ICD-10-CM

## 2017-05-26 PROCEDURE — G0283 ELEC STIM OTHER THAN WOUND: HCPCS | Performed by: PHYSICAL THERAPIST

## 2017-05-26 PROCEDURE — 97110 THERAPEUTIC EXERCISES: CPT | Performed by: PHYSICAL THERAPIST

## 2017-06-02 ENCOUNTER — HOSPITAL ENCOUNTER (OUTPATIENT)
Dept: PHYSICAL THERAPY | Facility: HOSPITAL | Age: 47
Setting detail: THERAPIES SERIES
Discharge: HOME | End: 2017-06-02

## 2017-06-02 DIAGNOSIS — S83.511D RUPTURE OF ANTERIOR CRUCIATE LIGAMENT OF RIGHT KNEE, SUBSEQUENT ENCOUNTER: Primary | ICD-10-CM

## 2017-06-02 PROCEDURE — 97110 THERAPEUTIC EXERCISES: CPT | Performed by: PHYSICAL THERAPIST

## 2017-06-02 NOTE — THERAPY TREATMENT NOTE
Outpatient Physical Therapy Ortho Treatment Note   Felisa Khan     Patient Name: Ruperto Michelle  : 1970  MRN: [id number]  Today's Date: 2017      Visit Date: 2017    Visit Dx:    ICD-10-CM ICD-9-CM   1. Rupture of anterior cruciate ligament of right knee, subsequent encounter S83.511D 844.2       Patient Active Problem List   Diagnosis   • Rupture of anterior cruciate ligament of right knee   • Status post repair of anterior cruciate ligament, right        Past Medical History:   Diagnosis Date   • Back pain     LOWER   • High cholesterol    • Right knee pain     SCHEDULED FOR SX        Past Surgical History:   Procedure Laterality Date   • KNEE ACL RECONSTRUCTION Right 4/10/2017    Procedure: KNEE ANTERIOR CRUCIATE LIGAMENT RECONSTRUCTION WITH ALLOGRAFT MEDIDAL FEMORAL CONDYLE CHRONDROPLASTY;  Surgeon: Rudy Gastelum MD;  Location: [facility];  Service:    • VASECTOMY               PT Ortho       17 09    Subjective Comments    Subjective Comments Pt states his knee is feeling better.  -GC    Subjective Pain    Able to rate subjective pain? yes  -GC    Pre-Treatment Pain Level 1  -GC      User Key  (r) = Recorded By, (t) = Taken By, (c) = Cosigned By    Initials Name Provider Type    RUPERTO Blair, PT Physical Therapist                            PT Assessment/Plan       17 09       PT Assessment    Assessment Comments Pt is doing well with decreased c/o pain and improved function.  -     PT Plan    PT Plan Comments Pt is to continue his HEP daily. Re-ck ain approximately one week.  -GC       User Key  (r) = Recorded By, (t) = Taken By, (c) = Cosigned By    Initials Name Provider Type    RUPERTO Blair, PT Physical Therapist                Modalities       17 09          Ice    Ice Applied Yes  -      Location right knee  -GC      Rx Minutes 15 mins  -GC      Ice S/P Rx Yes  -GC        User Key  (r) = Recorded By, (t) = Taken By, (c) = Cosigned By     "Initials Name Provider Type    GC Hector Blair, PT Physical Therapist                Exercises       06/02/17 0900          Subjective Comments    Subjective Comments Pt states his knee is feeling better.  -GC      Subjective Pain    Able to rate subjective pain? yes  -GC      Pre-Treatment Pain Level 1  -GC      Exercise 1    Exercise Name 1 Bike  -GC      Time (Minutes) 1 5 min  -GC      Exercise 3    Exercise Name 3 Hamstring stretch  -GC      Reps 3 15  -GC      Time (Seconds) 3 10 secs  -GC      Exercise 5    Exercise Name 5 LAQ with Russian Stim  -GC      Time (Minutes) 5 10 min 10/10  -GC      Exercise 6    Exercise Name 6 SLR  -GC      Equipment 6 Cuff Weight  -GC      Weights/Plates 6 Other Weight   6#  -GC      Reps 6 25  -GC      Exercise 7    Exercise Name 7 Hip ABD  -GC      Equipment 7 Cuff Weight  -GC      Weights/Plates 7 Other Weight   6#  -GC      Reps 7 25  -GC      Exercise 8    Exercise Name 8 Hip ADD  -GC      Equipment 8 Cuff Weight  -GC      Weights/Plates 8 Other Weight   6#  -GC      Reps 8 25  -GC      Exercise 9    Exercise Name 9 Hip Extension  -GC      Equipment 9 Cuff Weight  -GC      Weights/Plates 9 Other Weight   6#  -GC      Reps 9 25  -GC      Exercise 10    Exercise Name 10 Heel Raises  -GC      Reps 10 25  -GC      Exercise 11    Exercise Name 11 TKE vs theraband  -GC      Equipment 11 Theraband  -GC      Resistance 11 Other (comment)   gold  -GC      Reps 11 25  -GC      Exercise 12    Exercise Name 12 Forward step ups  -GC      Reps 12 20   6\" step with 15# dumbbells  -GC      Exercise 13    Exercise Name 13 Lateral step overs  -GC      Reps 13 20   6\" step with 15# dumbbells  -GC      Exercise 14    Exercise Name 14 Standing hamstring curls  -GC      Equipment 14 Cuff Weight  -GC      Weights/Plates 14 Other Weight   6#  -GC      Reps 14 25  -GC      Exercise 16    Exercise Name 16 Mini squats  -GC      Reps 16 25  -GC      Exercise 17    Exercise Name 17 Sidelying ITB " "stretch  -GC      Time (Minutes) 17 3 min  -GC      Exercise 18    Exercise Name 18 Hamstring vs stool  -GC      Reps 18 25  -GC      Exercise 19    Exercise Name 19 Lateral dips on 4\" step  -GC      Reps 19 25  -GC        User Key  (r) = Recorded By, (t) = Taken By, (c) = Cosigned By    Initials Name Provider Type    GC Hector Blair, PT Physical Therapist                                       Time Calculation:   Start Time: 0900  Stop Time: 1000  Time Calculation (min): 60 min    Therapy Charges for Today     Code Description Service Date Service Provider Modifiers Qty    62863760389  PT HOT OR COLD PACK TREAT MCARE 6/2/2017 Hector Blair, PT GP 1    16540170546 HC PT THER PROC EA 15 MIN 6/2/2017 Hector Blair, PT GP 2                    Hector Blair PT  6/2/2017     "

## 2017-06-07 ENCOUNTER — OFFICE VISIT (OUTPATIENT)
Dept: ORTHOPEDIC SURGERY | Facility: CLINIC | Age: 47
End: 2017-06-07

## 2017-06-07 VITALS — WEIGHT: 220 LBS | BODY MASS INDEX: 33.34 KG/M2 | HEIGHT: 68 IN

## 2017-06-07 DIAGNOSIS — Z98.890 STATUS POST REPAIR OF ANTERIOR CRUCIATE LIGAMENT: Primary | ICD-10-CM

## 2017-06-07 PROCEDURE — 99024 POSTOP FOLLOW-UP VISIT: CPT | Performed by: ORTHOPAEDIC SURGERY

## 2017-06-09 ENCOUNTER — HOSPITAL ENCOUNTER (OUTPATIENT)
Dept: PHYSICAL THERAPY | Facility: HOSPITAL | Age: 47
Setting detail: THERAPIES SERIES
Discharge: HOME | End: 2017-06-09

## 2017-06-09 DIAGNOSIS — S83.511D RUPTURE OF ANTERIOR CRUCIATE LIGAMENT OF RIGHT KNEE, SUBSEQUENT ENCOUNTER: Primary | ICD-10-CM

## 2017-06-09 PROCEDURE — 97110 THERAPEUTIC EXERCISES: CPT | Performed by: PHYSICAL THERAPIST

## 2017-06-09 NOTE — THERAPY TREATMENT NOTE
Outpatient Physical Therapy Ortho Treatment Note   Felisa Khan     Patient Name: Ruperto Michelle  : 1970  MRN: [id number]  Today's Date: 2017      Visit Date: 2017    Visit Dx:    ICD-10-CM ICD-9-CM   1. Rupture of anterior cruciate ligament of right knee, subsequent encounter S83.511D 844.2       Patient Active Problem List   Diagnosis   • Rupture of anterior cruciate ligament of right knee   • Status post repair of anterior cruciate ligament, right        Past Medical History:   Diagnosis Date   • Back pain     LOWER   • High cholesterol    • Right knee pain     SCHEDULED FOR SX        Past Surgical History:   Procedure Laterality Date   • KNEE ACL RECONSTRUCTION Right 4/10/2017    Procedure: KNEE ANTERIOR CRUCIATE LIGAMENT RECONSTRUCTION WITH ALLOGRAFT MEDIDAL FEMORAL CONDYLE CHRONDROPLASTY;  Surgeon: Rudy Gastelum MD;  Location: [facility];  Service:    • VASECTOMY               PT Ortho       17    Subjective Comments    Subjective Comments Pt states he saw the doctor yesterday and he said everything was intact. He does feel a little looseness in his knee still.  -    Subjective Pain    Able to rate subjective pain? yes  -    Pre-Treatment Pain Level 1  -      User Key  (r) = Recorded By, (t) = Taken By, (c) = Cosigned By    Initials Name Provider Type    RUPERTO Blair, PT Physical Therapist                            PT Assessment/Plan       17       PT Assessment    Assessment Comments Pt still lacks necessary quad strength, but he is tolerating increased exercises well.  -     PT Plan    PT Plan Comments Pt is to continue his HEP daily. Re-ck in approximately one week.  -       User Key  (r) = Recorded By, (t) = Taken By, (c) = Cosigned By    Initials Name Provider Type    RUPERTO Blair, PT Physical Therapist                    Exercises       17          Subjective Comments    Subjective Comments Pt states he saw the doctor  "yesterday and he said everything was intact. He does feel a little looseness in his knee still.  -GC      Subjective Pain    Able to rate subjective pain? yes  -GC      Pre-Treatment Pain Level 1  -GC      Exercise 1    Exercise Name 1 Bike  -GC      Time (Minutes) 1 5 min  -GC      Exercise 3    Exercise Name 3 Hamstring stretch  -GC      Reps 3 15  -GC      Time (Seconds) 3 10 secs  -GC      Exercise 5    Exercise Name 5 LAQ with Russian Stim  -GC      Time (Minutes) 5 10 min 10/10  -GC      Exercise 6    Exercise Name 6 SLR  -GC      Equipment 6 Cuff Weight  -GC      Weights/Plates 6 Other Weight   7#  -GC      Reps 6 25  -GC      Exercise 7    Exercise Name 7 Hip ABD  -GC      Equipment 7 Cuff Weight  -GC      Weights/Plates 7 Other Weight   7#  -GC      Reps 7 25  -GC      Exercise 8    Exercise Name 8 Hip ADD  -GC      Equipment 8 Cuff Weight  -GC      Weights/Plates 8 Other Weight   7#  -GC      Reps 8 25  -GC      Exercise 9    Exercise Name 9 Hip Extension  -GC      Equipment 9 Cuff Weight  -GC      Weights/Plates 9 Other Weight   7#  -GC      Reps 9 25  -GC      Exercise 10    Exercise Name 10 Heel Raises  -GC      Equipment 10 Dumbell  -GC      Weights/Plates 10 15  -GC      Reps 10 25  -GC      Exercise 11    Exercise Name 11 TKE vs theraband  -GC      Equipment 11 Theraband  -GC      Resistance 11 Other (comment)   gold  -GC      Reps 11 25  -GC      Exercise 12    Exercise Name 12 Forward step ups  -GC      Equipment 12 Dumbell  -GC      Weights/Plates 12 15  -GC      Reps 12 20   6\" step with 15# dumbbells  -GC      Exercise 13    Exercise Name 13 Lateral step overs  -GC      Equipment 13 Dumbell  -GC      Weights/Plates 13 15  -GC      Reps 13 20   6\" step with 15# dumbbells  -GC      Exercise 14    Exercise Name 14 Standing hamstring curls  -GC      Equipment 14 Cuff Weight  -GC      Weights/Plates 14 Other Weight   7#  -GC      Reps 14 25  -GC      Exercise 16    Exercise Name 16 Mini squats  -GC   " "   Equipment 16 Dumbell  -      Weights/Plates 16 15  -GC      Reps 16 25  -GC      Exercise 17    Exercise Name 17 Sidelying ITB stretch  -      Time (Minutes) 17 3 min  -GC      Exercise 18    Exercise Name 18 Hamstring vs stool  -      Reps 18 25  -GC      Exercise 19    Exercise Name 19 Lateral dips on 4\" step  -      Reps 19 25  -GC        User Key  (r) = Recorded By, (t) = Taken By, (c) = Cosigned By    Initials Name Provider Type     Hector Blair, PT Physical Therapist                                       Time Calculation:   Start Time: 0900  Stop Time: 1000  Time Calculation (min): 60 min    Therapy Charges for Today     Code Description Service Date Service Provider Modifiers Qty    61788113664 HC PT THER PROC EA 15 MIN 6/9/2017 Hector Blair, PT GP 2                    Hector Blair, PT  6/9/2017     "

## 2017-06-12 NOTE — PROGRESS NOTES
CC:Status post right knee ACL reconstruction with bone patella tendon bone allograft, medial femoral condyle chondroplasty, 4/10/2017    Interval history: Patient returns to clinic today, 8 weeks from surgery, doing well with physical therapy in regards to strengthening, range of motion, and he'll return to activity.  He feels like his improvement has slightly plateaued over the last several weeks but he still has had a schedule in regards to postoperative protocol. Denies any locking, catching, or giving way of right knee.  Occasionally using hinged knee brace.  Denies any numbness, tingling, or issues with incisions over the knee.    Physical exam:              Right knee:   Incisions- clean dry, and intact, healing well   Effusion minimal   ROM- 0 - 130 degrees   Strength- 4+ of 5   stable to testing and Lachman: stable   No joint line pain   Positive sensation light touch    Brisk cap refill    Impression: Status post right knee ACL reconstruction, medial femoral condyle chondroplasty    Plan:  1.  Continue with physical therapy for work on range of motion and strengthening.  2.  Will follow-up in 2 months for reevaluation of motion and strength, repeat right knee x-rays at follow-up.  3.  All questions answered today.  Recommended patient to continue to be patient with progress now, he is still have schedule, anti-inflammatory's as needed if he has any flares of inflammation, pain, or swelling.  He is happy with result from surgery.

## 2017-06-20 ENCOUNTER — HOSPITAL ENCOUNTER (OUTPATIENT)
Dept: PHYSICAL THERAPY | Facility: HOSPITAL | Age: 47
Setting detail: THERAPIES SERIES
Discharge: HOME | End: 2017-06-20

## 2017-06-20 DIAGNOSIS — S83.511D RUPTURE OF ANTERIOR CRUCIATE LIGAMENT OF RIGHT KNEE, SUBSEQUENT ENCOUNTER: Primary | ICD-10-CM

## 2017-06-20 PROCEDURE — 97110 THERAPEUTIC EXERCISES: CPT | Performed by: PHYSICAL THERAPIST

## 2017-06-20 NOTE — THERAPY TREATMENT NOTE
Outpatient Physical Therapy Ortho Treatment Note   Felisa Khan     Patient Name: Ruperto Michelle  : 1970  MRN: [id number]  Today's Date: 2017      Visit Date: 2017    Visit Dx:    ICD-10-CM ICD-9-CM   1. Rupture of anterior cruciate ligament of right knee, subsequent encounter S83.511D 844.2       Patient Active Problem List   Diagnosis   • Rupture of anterior cruciate ligament of right knee   • Status post repair of anterior cruciate ligament, right        Past Medical History:   Diagnosis Date   • Back pain     LOWER   • High cholesterol    • Right knee pain     SCHEDULED FOR SX        Past Surgical History:   Procedure Laterality Date   • KNEE ACL RECONSTRUCTION Right 4/10/2017    Procedure: KNEE ANTERIOR CRUCIATE LIGAMENT RECONSTRUCTION WITH ALLOGRAFT MEDIDAL FEMORAL CONDYLE CHRONDROPLASTY;  Surgeon: Rudy Gastelum MD;  Location: [facility];  Service:    • VASECTOMY               PT Ortho       17 0900    Subjective Comments    Subjective Comments Pt still c/o pain when he is weight bearing with his knee flexed approximately 30 degrees. He states he can walk and do most things without difficulty.  -GC    Subjective Pain    Able to rate subjective pain? yes  -    Pre-Treatment Pain Level 0  -GC    General LE Assessment    ROM Detail SLR @ 0 degrees, TKE = 0 degrees  -GC    Right Knee    Extension/Flexion AROM Deficit 0-139 degrees  -GC    Right Hip    Hip Flexion Gross Movement (5/5) normal  -GC    Hip Extension Gross Movement (5/5) normal  -GC    Hip ABduction Gross Movement (5/5) normal  -GC    Hip ADduction Gross Movement (5/5) normal  -GC    Right Knee    Knee Extension Gross Movement (4+/5) good plus  -GC    Knee Flexion Gross Movement (4+/5) good plus  -GC    Right Ankle/Foot    Ankle PF Gross Movement (5/5) normal  -GC    Ankle Dorsiflexion Gross Movement (5/5) normal  -GC    Lower Extremity Flexibility    Hamstrings Right:;WNL  -GC    Quadriceps Right:;WNL  -GC     Gastrocnemius Right:;WNL  -GC    Gait Assessment/Treatment    Gait, Comment Pt ambulates normally on levels and stairs.  -GC      User Key  (r) = Recorded By, (t) = Taken By, (c) = Cosigned By    Initials Name Provider Type    RUPERTO Blair, PT Physical Therapist                            PT Assessment/Plan       06/20/17 0900       PT Assessment    Assessment Comments Pt is doing well, but still lacks the necessary funcitonal strength for weight bearing with knee flexion. Feel his femoral chondroplasty is also contributing to this pain as well.  -GC     PT Plan    PT Plan Comments Pt is to continue his HEP daily.  -GC       User Key  (r) = Recorded By, (t) = Taken By, (c) = Cosigned By    Initials Name Provider Type    RUPERTO Blair, PT Physical Therapist                Modalities       06/20/17 0900          Ice    Ice Applied Yes  -GC      Location right knee  -GC      Rx Minutes 10 mins  -GC      Ice S/P Rx Yes  -GC        User Key  (r) = Recorded By, (t) = Taken By, (c) = Cosigned By    Initials Name Provider Type    RUPERTO Blair, PT Physical Therapist                Exercises       06/20/17 0900          Subjective Comments    Subjective Comments Pt still c/o pain when he is weight bearing with his knee flexed approximately 30 degrees. He states he can walk and do most things without difficulty.  -GC      Subjective Pain    Able to rate subjective pain? yes  -GC      Pre-Treatment Pain Level 0  -GC      Exercise 1    Exercise Name 1 Bike  -GC      Time (Minutes) 1 5 min  -GC      Exercise 3    Exercise Name 3 Hamstring stretch  -GC      Reps 3 15  -GC      Time (Seconds) 3 10 secs  -GC      Exercise 5    Exercise Name 5 LAQ with Russian Stim  -GC      Time (Minutes) 5 10 min 10/10  -GC      Exercise 6    Exercise Name 6 SLR  -GC      Equipment 6 Cuff Weight  -GC      Weights/Plates 6 Other Weight   8#  -GC      Reps 6 25  -GC      Exercise 7    Exercise Name 7 Hip ABD  -GC      Equipment 7 Cuff  "Weight  -GC      Weights/Plates 7 Other Weight   8#  -GC      Reps 7 25  -GC      Exercise 8    Exercise Name 8 Hip ADD  -GC      Equipment 8 Cuff Weight  -GC      Weights/Plates 8 Other Weight   8#  -GC      Reps 8 25  -GC      Exercise 9    Exercise Name 9 Hip Extension  -GC      Equipment 9 Cuff Weight  -GC      Weights/Plates 9 Other Weight   8#  -GC      Reps 9 25  -GC      Exercise 10    Exercise Name 10 Heel Raises  -GC      Cueing 10 Verbal  -GC      Equipment 10 Dumbell   gold theraband also  -GC      Weights/Plates 10 15  -GC      Reps 10 25  -GC      Exercise 11    Exercise Name 11 TKE vs theraband  -GC      Equipment 11 Theraband  -GC      Resistance 11 Other (comment)   gold  -GC      Reps 11 25  -GC      Exercise 12    Exercise Name 12 Forward step ups  -GC      Equipment 12 Dumbell  -GC      Weights/Plates 12 15  -GC      Reps 12 25   6\" step with 15# dumbbells  -GC      Exercise 13    Exercise Name 13 Lateral step overs  -GC      Equipment 13 Dumbell  -GC      Weights/Plates 13 15  -GC      Reps 13 25   6\" step with 15# dumbbells  -GC      Exercise 14    Exercise Name 14 Standing hamstring curls  -GC      Equipment 14 Cuff Weight  -GC      Weights/Plates 14 Other Weight   8#  -GC      Reps 14 25  -GC      Exercise 16    Exercise Name 16 Mini squats  -GC      Cueing 16 Verbal  -GC      Equipment 16 Dumbell   gold theraband also  -GC      Weights/Plates 16 15  -GC      Reps 16 25  -GC      Exercise 17    Exercise Name 17 Sidelying ITB stretch  -GC      Time (Minutes) 17 --  -GC      Exercise 18    Exercise Name 18 Hamstring vs stool  -GC      Cueing 18 Verbal  -GC      Equipment 18 Theraband  -GC      Resistance 18 Other (comment)   GOLD  -GC      Reps 18 Other   40  -GC      Exercise 19    Exercise Name 19 Lateral dips on 2\" step  -GC      Reps 19 Other   50  -GC        User Key  (r) = Recorded By, (t) = Taken By, (c) = Cosigned By    Initials Name Provider Type    RUPERTO Blair, PT Physical " Therapist                               PT OP Goals       06/20/17 0900       PT Short Term Goals    STG Date to Achieve 05/11/17  -GC     STG 1 Decrease right knee pain with activity to 3-4/10.  -GC     STG 1 Progress Met  -GC     STG 2 Increase AROM of right knee to 0-110 degrees with testing.  -GC     STG 2 Progress Met  -GC     STG 3 Increase right LE strength to at least 4/5 all planes with testing.  -GC     STG 3 Progress Met  -GC     STG 4 Decrease effusion right knee to WFL with testing.  -GC     STG 4 Progress Met  -GC     STG 5 Pt will be independent with his HEP issued by this therapist.  -GC     STG 5 Progress Met  -GC     Long Term Goals    LTG Date to Achieve 06/08/17  -GC     LTG 1 Decrease right knee pain with activity to 0-1/10.  -GC     LTG 1 Progress Met  -GC     LTG 2 Increase AROM right knee to 0-130 degrees with testing.  -GC     LTG 2 Progress Met  -GC     LTG 3 Increase right LE strength to 5/5 all planes with testing.  -GC     LTG 3 Progress Ongoing;Progressing;Partially Met  -GC     LTG 4 Restore normal gait on levels and stairs without assistive device.  -GC     LTG 4 Progress Met  -GC     LTG 5 Pt will be indpendent with all ADLs and have a LEFS score > 65.  -GC     LTG 5 Progress Ongoing;Progressing;Partially Met  -GC       User Key  (r) = Recorded By, (t) = Taken By, (c) = Cosigned By    Initials Name Provider Type    RUPERTO Blair PT Physical Therapist                    Time Calculation:   Start Time: 0900  Stop Time: 0954  Time Calculation (min): 54 min    Therapy Charges for Today     Code Description Service Date Service Provider Modifiers Qty    34066386198 HC PT THER PROC EA 15 MIN 6/20/2017 Hector Blair PT GP 2    50699136403 HC PT HOT OR COLD PACK TREAT MCARE 6/20/2017 Hector Blair PT GP 1                    Hector Blair PT  6/20/2017

## 2017-06-27 ENCOUNTER — HOSPITAL ENCOUNTER (OUTPATIENT)
Dept: PHYSICAL THERAPY | Facility: HOSPITAL | Age: 47
Setting detail: THERAPIES SERIES
Discharge: HOME | End: 2017-06-27

## 2017-06-27 DIAGNOSIS — S83.511D RUPTURE OF ANTERIOR CRUCIATE LIGAMENT OF RIGHT KNEE, SUBSEQUENT ENCOUNTER: Primary | ICD-10-CM

## 2017-06-27 PROCEDURE — 97110 THERAPEUTIC EXERCISES: CPT | Performed by: PHYSICAL THERAPIST

## 2017-06-27 NOTE — THERAPY TREATMENT NOTE
Outpatient Physical Therapy Ortho Treatment Note   Felisa Khan     Patient Name: Ruperto Michelle  : 1970  MRN: [id number]  Today's Date: 2017      Visit Date: 2017    Visit Dx:    ICD-10-CM ICD-9-CM   1. Rupture of anterior cruciate ligament of right knee, subsequent encounter S83.511D 844.2       Patient Active Problem List   Diagnosis   • Rupture of anterior cruciate ligament of right knee   • Status post repair of anterior cruciate ligament, right        Past Medical History:   Diagnosis Date   • Back pain     LOWER   • High cholesterol    • Right knee pain     SCHEDULED FOR SX        Past Surgical History:   Procedure Laterality Date   • KNEE ACL RECONSTRUCTION Right 4/10/2017    Procedure: KNEE ANTERIOR CRUCIATE LIGAMENT RECONSTRUCTION WITH ALLOGRAFT MEDIDAL FEMORAL CONDYLE CHRONDROPLASTY;  Surgeon: Rudy Gastelum MD;  Location: [facility];  Service:    • VASECTOMY               PT Ortho       17 0900    Subjective Comments    Subjective Comments Pt still c/o pain deep in his knee when he goes up/down stairs and when he has his knee bent slightly in a weight bearing position. He states he cannot do a leg press exercise secondary to the pain. Partial squats also are limited by his pain.  -    Knee Special Tests    Valgus stress (MCL lesion) Right:;Negative  -    Varus stress (LCL lesion) Right:;Negative  -    Patellar grind test (chondromalacia patella) Right:;Positive  -    General LE Assessment    ROM Detail SLR @ 0 degrees and TKE = 0 degrees  -    Right Knee    Extension/Flexion AROM Deficit 0-137 degrees  -      User Key  (r) = Recorded By, (t) = Taken By, (c) = Cosigned By    Initials Name Provider Type    RUPERTO Blair, PT Physical Therapist                            PT Assessment/Plan       17 0900       PT Assessment    Assessment Comments Will treat pt as chondromalacia patient at this time and re-assess in approximately one week.  -     PT Plan     PT Plan Comments Pt is to continue his HEP daily. Re-ck in approximately one week.  -GC       User Key  (r) = Recorded By, (t) = Taken By, (c) = Cosigned By    Initials Name Provider Type    RUPERTO Blair, PT Physical Therapist                Modalities       06/27/17 0900          Ice    Ice Applied Yes  -GC      Location right knee with IFC  -GC      Rx Minutes 15 mins  -GC      Ice S/P Rx Yes  -GC      ELECTRICAL STIMULATION    Attended/Unattended Unattended  -GC      Stimulation Type IFC  -GC      Location/Electrode Placement/Other right knee with ice  -GC      Rx Minutes 15 mins  -GC        User Key  (r) = Recorded By, (t) = Taken By, (c) = Cosigned By    Initials Name Provider Type    RUPERTO Blair, PT Physical Therapist                Exercises       06/27/17 0900          Subjective Comments    Subjective Comments Pt still c/o pain deep in his knee when he goes up/down stairs and when he has his knee bent slightly in a weight bearing position. He states he cannot do a leg press exercise secondary to the pain. Partial squats also are limited by his pain.  -GC      Exercise 1    Exercise Name 1 Hamstring stretch  -GC      Reps 1 15  -GC      Time (Seconds) 1 10 secs  -GC      Exercise 2    Exercise Name 2 LAQ with Russian stim  -GC      Time (Minutes) 2 10 min 10/10  -GC      Exercise 3    Exercise Name 3 SLR  -GC      Equipment 3 Cuff Weight  -GC      Weights/Plates 3 5  -GC      Reps 3 Other   50  -GC      Exercise 4    Exercise Name 4 Hip ADD  -GC      Equipment 4 Cuff Weight  -GC      Weights/Plates 4 5  -GC      Reps 4 Other   50  -GC      Exercise 5    Exercise Name 5 SAQ  -GC      Equipment 5 Cuff Weight  -GC      Weights/Plates 5 5  -GC      Reps 5 Other   100  -GC      Exercise 6    Exercise Name 6 LAQ/ball squeeze  -GC      Equipment 6 Cuff Weight  -GC      Weights/Plates 6 5  -GC      Reps 6 Other   50  -GC      Exercise 7    Exercise Name 7 TKE vs theraband  -GC      Equipment 7 Theraband   -GC      Resistance 7 Other (comment)   gold  -GC      Reps 7 Other   50  -GC        User Key  (r) = Recorded By, (t) = Taken By, (c) = Cosigned By    Initials Name Provider Type    GC Hector Blair, PT Physical Therapist                                       Time Calculation:   Start Time: 0900  Stop Time: 0955  Time Calculation (min): 55 min    Therapy Charges for Today     Code Description Service Date Service Provider Modifiers Qty    27186482399 HC PT THER PROC EA 15 MIN 6/27/2017 Hector Blair, PT GP 1                    Hector Blair, PT  6/27/2017

## 2017-07-03 ENCOUNTER — HOSPITAL ENCOUNTER (OUTPATIENT)
Dept: PHYSICAL THERAPY | Facility: HOSPITAL | Age: 47
Setting detail: THERAPIES SERIES
Discharge: HOME | End: 2017-07-03

## 2017-07-03 DIAGNOSIS — S83.511D RUPTURE OF ANTERIOR CRUCIATE LIGAMENT OF RIGHT KNEE, SUBSEQUENT ENCOUNTER: Primary | ICD-10-CM

## 2017-07-03 PROCEDURE — 97110 THERAPEUTIC EXERCISES: CPT | Performed by: PHYSICAL THERAPIST

## 2017-07-03 NOTE — THERAPY TREATMENT NOTE
Outpatient Physical Therapy Ortho Treatment Note   Felisa Khan     Patient Name: Ruperto Michelle  : 1970  MRN: [id number]  Today's Date: 7/3/2017      Visit Date: 2017    Visit Dx:    ICD-10-CM ICD-9-CM   1. Rupture of anterior cruciate ligament of right knee, subsequent encounter S83.511D 844.2       Patient Active Problem List   Diagnosis   • Rupture of anterior cruciate ligament of right knee   • Status post repair of anterior cruciate ligament, right        Past Medical History:   Diagnosis Date   • Back pain     LOWER   • High cholesterol    • Right knee pain     SCHEDULED FOR SX        Past Surgical History:   Procedure Laterality Date   • KNEE ACL RECONSTRUCTION Right 4/10/2017    Procedure: KNEE ANTERIOR CRUCIATE LIGAMENT RECONSTRUCTION WITH ALLOGRAFT MEDIDAL FEMORAL CONDYLE CHRONDROPLASTY;  Surgeon: Rudy Gastelum MD;  Location: [facility];  Service:    • VASECTOMY               PT Ortho       17    Subjective Comments    Subjective Comments Pt states he walked 6 miles yesterday and did not have pain, but when he stod from a low chair, the pain was bad enough that he almost fell.  -      User Key  (r) = Recorded By, (t) = Taken By, (c) = Cosigned By    Initials Name Provider Type    RUPERTO Blair PT Physical Therapist                            PT Assessment/Plan       17       PT Assessment    Assessment Comments Pt still has same limitations with no real progress.  -     PT Plan    PT Plan Comments Pt to call MD for follow up. Will continue his HEP daily until MD follow up.  -       User Key  (r) = Recorded By, (t) = Taken By, (c) = Cosigned By    Initials Name Provider Type    RUPERTO Blair PT Physical Therapist                    Exercises       17          Subjective Comments    Subjective Comments Pt states he walked 6 miles yesterday and did not have pain, but when he stod from a low chair, the pain was bad enough that he almost  fell.  -GC      Exercise 1    Exercise Name 1 Hamstring stretch  -GC      Reps 1 15  -GC      Time (Seconds) 1 10 secs  -GC      Exercise 2    Exercise Name 2 LAQ with Russian stim  -GC      Time (Minutes) 2 10 min 10/10  -GC      Exercise 3    Exercise Name 3 SLR  -GC      Equipment 3 Cuff Weight  -GC      Weights/Plates 3 Other Weight   7#  -GC      Reps 3 Other   50  -GC      Exercise 4    Exercise Name 4 Hip ADD  -GC      Equipment 4 Cuff Weight  -GC      Weights/Plates 4 Other Weight   7#  -GC      Reps 4 Other   50  -GC      Exercise 5    Exercise Name 5 SAQ  -GC      Equipment 5 Cuff Weight  -GC      Weights/Plates 5 Other Weight   7#  -GC      Reps 5 Other   100  -GC      Exercise 6    Exercise Name 6 LAQ/ball squeeze  -GC      Equipment 6 Cuff Weight  -GC      Weights/Plates 6 5  -GC      Reps 6 Other   50  -GC      Exercise 7    Exercise Name 7 TKE vs theraband  -GC      Equipment 7 Theraband  -GC      Resistance 7 Other (comment)   gold  -GC      Reps 7 Other   50  -GC        User Key  (r) = Recorded By, (t) = Taken By, (c) = Cosigned By    Initials Name Provider Type    GC Hector Blair, PT Physical Therapist                                       Time Calculation:   Start Time: 0900  Stop Time: 0950  Time Calculation (min): 50 min    Therapy Charges for Today     Code Description Service Date Service Provider Modifiers Qty    65916177497  PT THER PROC EA 15 MIN 7/3/2017 Hector Blair PT GP 1                    Hector Blair PT  7/3/2017

## 2017-07-05 ENCOUNTER — OFFICE VISIT (OUTPATIENT)
Dept: ORTHOPEDIC SURGERY | Facility: CLINIC | Age: 47
End: 2017-07-05

## 2017-07-05 DIAGNOSIS — M22.2X1 PATELLOFEMORAL STRESS SYNDROME OF RIGHT KNEE: ICD-10-CM

## 2017-07-05 DIAGNOSIS — Z98.890 STATUS POST REPAIR OF ANTERIOR CRUCIATE LIGAMENT: Primary | ICD-10-CM

## 2017-07-05 PROCEDURE — 99024 POSTOP FOLLOW-UP VISIT: CPT | Performed by: ORTHOPAEDIC SURGERY

## 2017-07-05 RX ADMIN — TRIAMCINOLONE ACETONIDE 80 MG: 40 INJECTION, SUSPENSION INTRA-ARTICULAR; INTRAMUSCULAR at 08:38

## 2017-07-05 RX ADMIN — LIDOCAINE HYDROCHLORIDE 4 ML: 10 INJECTION, SOLUTION EPIDURAL; INFILTRATION; INTRACAUDAL; PERINEURAL at 08:38

## 2017-07-05 NOTE — PROGRESS NOTES
Large Joint Arthrocentesis  Date/Time: 7/5/2017 8:38 AM  Consent given by: patient  Site marked: site marked  Timeout: Immediately prior to procedure a time out was called to verify the correct patient, procedure, equipment, support staff and site/side marked as required   Supporting Documentation  Indications: pain   Procedure Details  Location: knee - R knee  Preparation: Patient was prepped and draped in the usual sterile fashion  Needle size: 22 G  Approach: superior  Medications administered: 4 mL lidocaine PF 1% 1 %; 80 mg triamcinolone acetonide 40 MG/ML  Patient tolerance: patient tolerated the procedure well with no immediate complications        CC:Status post right knee ACL reconstruction with bone patella tendon bone allograft, medial femoral condyle chondroplasty, 4/10/2017     Interval history: Patient returns to clinic today, states he has been doing very well particular for the first 7 weeks postop but over the last 2-3 weeks he's been having increasing pain over the anterior aspect of his right knee with mild associated crepitus and intermittent swelling noted.  Pain is been primarily with knee flexion against resistance, does notice it with stair climbing as well.  Denies any maximilian locking.  Denies any giving way or buckling of his knee.  Denies any numbness, tingling, or issues with incisions over the knee.     Physical exam:     Right knee:  Incisions- clean dry, and intact, healing well  Effusion minimal  ROM- 0 - 130 degrees  Strength- 4+ of 5  stable to testing and Lachman: stable  No joint line pain  Positive active patellar compression test with moderate tenderness along medial and lateral patellar facet  Positive sensation light touch   Brisk cap refill     Impression: Status post right knee ACL reconstruction, medial femoral condyle chondroplasty     Plan:  1. Continue with physical therapy for work on range of motion and strengthening.  2  discussed with patient that given his persistent  struggle over the last several weeks with anterior knee pain and is evidence of chondromalacia on his arthroscopy, we could proceed with intra-articular injection today to treat his patellofemoral chondromalacia.  He was in agreement with that wish proceed today to try to help with his pain and improvement in function.  3. All questions answered today. Recommended patient to continue to be patient with progress now, he is still have schedule, anti-inflammatory's as needed if he has any flares of inflammation, pain, or swelling. He is happy with result from surgery.  4.  Will follow-up in 6 weeks with x-rays right knee at that time.

## 2017-07-13 RX ORDER — TRIAMCINOLONE ACETONIDE 40 MG/ML
80 INJECTION, SUSPENSION INTRA-ARTICULAR; INTRAMUSCULAR
Status: COMPLETED | OUTPATIENT
Start: 2017-07-05 | End: 2017-07-05

## 2017-07-13 RX ORDER — LIDOCAINE HYDROCHLORIDE 10 MG/ML
4 INJECTION, SOLUTION EPIDURAL; INFILTRATION; INTRACAUDAL; PERINEURAL
Status: COMPLETED | OUTPATIENT
Start: 2017-07-05 | End: 2017-07-05

## 2017-07-18 ENCOUNTER — HOSPITAL ENCOUNTER (OUTPATIENT)
Dept: PHYSICAL THERAPY | Facility: HOSPITAL | Age: 47
Setting detail: THERAPIES SERIES
Discharge: HOME | End: 2017-07-18

## 2017-07-18 DIAGNOSIS — S83.511D RUPTURE OF ANTERIOR CRUCIATE LIGAMENT OF RIGHT KNEE, SUBSEQUENT ENCOUNTER: Primary | ICD-10-CM

## 2017-07-18 PROCEDURE — 97110 THERAPEUTIC EXERCISES: CPT | Performed by: PHYSICAL THERAPIST

## 2017-07-18 PROCEDURE — G0283 ELEC STIM OTHER THAN WOUND: HCPCS | Performed by: PHYSICAL THERAPIST

## 2017-07-18 NOTE — THERAPY TREATMENT NOTE
Outpatient Physical Therapy Ortho Treatment Note   Felisa Khan     Patient Name: Ruperto Michelle  : 1970  MRN: [id number]  Today's Date: 2017      Visit Date: 2017    Visit Dx:    ICD-10-CM ICD-9-CM   1. Rupture of anterior cruciate ligament of right knee, subsequent encounter S83.511D 844.2       Patient Active Problem List   Diagnosis   • Rupture of anterior cruciate ligament of right knee   • Status post repair of anterior cruciate ligament, right   • Patellofemoral stress syndrome of right knee        Past Medical History:   Diagnosis Date   • Back pain     LOWER   • High cholesterol    • Right knee pain     SCHEDULED FOR SX        Past Surgical History:   Procedure Laterality Date   • KNEE ACL RECONSTRUCTION Right 4/10/2017    Procedure: KNEE ANTERIOR CRUCIATE LIGAMENT RECONSTRUCTION WITH ALLOGRAFT MEDIDAL FEMORAL CONDYLE CHRONDROPLASTY;  Surgeon: Rudy Gastelum MD;  Location: [facility];  Service:    • VASECTOMY               PT Ortho       17 09    Subjective Comments    Subjective Comments Patient reports he is doing well with no pain. Patient reported soreness following a seven mile hike yesterday but it has not interfered with any of his ADL's.  -    Subjective Pain    Able to rate subjective pain? yes  -    Pre-Treatment Pain Level 0  -GC    Right Knee    Extension/Flexion AROM Deficit 0-140 degrees  -GC    Right Hip    Hip Flexion Gross Movement (5/5) normal  -GC    Hip Extension Gross Movement (5/5) normal  -GC    Hip ABduction Gross Movement (5/5) normal  -GC    Hip ADduction Gross Movement (5/5) normal  -      User Key  (r) = Recorded By, (t) = Taken By, (c) = Cosigned By    Initials Name Provider Type     Hector Blair, PT Physical Therapist                            PT Assessment/Plan       17 09       PT Assessment    Assessment Comments Patient tolerated treatment with progression to strengthening exercises without complaints. Patient single leg  squat has improved depth with less pain. Patient reported no tenderness to palpation on right knee.  -GC     PT Plan    PT Plan Comments Patient to continue current treatment plan and HEP in order to continue to improve right quadricep strength.  -GC       User Key  (r) = Recorded By, (t) = Taken By, (c) = Cosigned By    Initials Name Provider Type    GC Hector Blair, PT Physical Therapist                Modalities       07/18/17 0900          Ice    Ice Applied Yes  -GC      Location right knee with IFC  -GC      Rx Minutes 15 mins  -GC      Ice S/P Rx Yes  -GC      ELECTRICAL STIMULATION    Attended/Unattended Unattended  -GC      Stimulation Type IFC  -GC      Location/Electrode Placement/Other right knee with ice  -GC      Rx Minutes 15 mins  -GC        User Key  (r) = Recorded By, (t) = Taken By, (c) = Cosigned By    Initials Name Provider Type    GC Hector Blair, PT Physical Therapist                Exercises       07/18/17 0900          Subjective Comments    Subjective Comments Patient reports he is doing well with no pain. Patient reported soreness following a seven mile hike yesterday but it has not interfered with any of his ADL's.  -GC      Subjective Pain    Able to rate subjective pain? yes  -GC      Pre-Treatment Pain Level 0  -GC      Exercise 1    Exercise Name 1 Hamstring stretch  -GC      Reps 1 15  -GC      Time (Seconds) 1 10 secs  -GC      Exercise 2    Exercise Name 2 LAQ with Russian stim  -GC      Time (Minutes) 2 10 min 10/10  -GC      Exercise 3    Exercise Name 3 SLR  -GC      Equipment 3 Cuff Weight  -GC      Weights/Plates 3 Other Weight   8#  -GC      Reps 3 Other   50  -GC      Exercise 4    Exercise Name 4 Hip ADD  -GC      Equipment 4 Cuff Weight  -GC      Weights/Plates 4 Other Weight   8#  -GC      Reps 4 Other   50  -GC      Exercise 5    Exercise Name 5 SAQ  -GC      Equipment 5 Cuff Weight  -GC      Weights/Plates 5 Other Weight   8#  -GC      Reps 5 Other   100  -GC       Exercise 6    Exercise Name 6 LAQ/ball squeeze  -GC      Equipment 6 Cuff Weight  -GC      Weights/Plates 6 8  -GC      Reps 6 Other   50  -GC      Exercise 7    Exercise Name 7 TKE vs theraband  -GC      Equipment 7 Theraband  -GC      Resistance 7 Other (comment)   gold  -GC      Reps 7 Other   50  -GC      Exercise 8    Exercise Name 8 Lateral Dips  -GC      Equipment 8 Other   2 inch step  -GC      Reps 8 Other   35  -GC        User Key  (r) = Recorded By, (t) = Taken By, (c) = Cosigned By    Initials Name Provider Type     Hector Blair, PT Physical Therapist                                       Time Calculation:   Start Time: 0900  Stop Time: 1010  Time Calculation (min): 70 min    Therapy Charges for Today     Code Description Service Date Service Provider Modifiers Qty    23414676892 HC PT THER PROC EA 15 MIN 7/18/2017 Hector Blair, PT GP 2    [id number] HC PT ELECTRICAL STIM UNATTENDED 7/18/2017 Hector Blair, PT  1            Note completed by Zoe Horan, PT student        Hector Blair, PT  7/18/2017

## 2017-07-25 ENCOUNTER — HOSPITAL ENCOUNTER (OUTPATIENT)
Dept: PHYSICAL THERAPY | Facility: HOSPITAL | Age: 47
Setting detail: THERAPIES SERIES
Discharge: HOME | End: 2017-07-25

## 2017-07-25 DIAGNOSIS — S83.511D RUPTURE OF ANTERIOR CRUCIATE LIGAMENT OF RIGHT KNEE, SUBSEQUENT ENCOUNTER: Primary | ICD-10-CM

## 2017-07-25 PROCEDURE — 97110 THERAPEUTIC EXERCISES: CPT | Performed by: PHYSICAL THERAPIST

## 2017-07-25 NOTE — THERAPY TREATMENT NOTE
Outpatient Physical Therapy Ortho Treatment Note   Felisa Khan     Patient Name: Ruperto Michelle  : 1970  MRN: [id number]  Today's Date: 2017      Visit Date: 2017    Visit Dx:    ICD-10-CM ICD-9-CM   1. Rupture of anterior cruciate ligament of right knee, subsequent encounter S83.511D 844.2       Patient Active Problem List   Diagnosis   • Rupture of anterior cruciate ligament of right knee   • Status post repair of anterior cruciate ligament, right   • Patellofemoral stress syndrome of right knee        Past Medical History:   Diagnosis Date   • Back pain     LOWER   • High cholesterol    • Right knee pain     SCHEDULED FOR SX        Past Surgical History:   Procedure Laterality Date   • KNEE ACL RECONSTRUCTION Right 4/10/2017    Procedure: KNEE ANTERIOR CRUCIATE LIGAMENT RECONSTRUCTION WITH ALLOGRAFT MEDIDAL FEMORAL CONDYLE CHRONDROPLASTY;  Surgeon: Rudy Gastelum MD;  Location: [facility];  Service:    • VASECTOMY               PT Ortho       17 0900    Subjective Comments    Subjective Comments Pt reports his knee is feeling better wiht the squatting position, but it is still not as strong as his other knee.  -      User Key  (r) = Recorded By, (t) = Taken By, (c) = Cosigned By    Initials Name Provider Type    RUPERTO Blair, PT Physical Therapist                            PT Assessment/Plan       17 09       PT Assessment    Assessment Comments Pt is doing very well wtih improved functional strength and good tolerance to increased strengthening.  -     PT Plan    PT Plan Comments Pt is to continue his HEP daily.  -       User Key  (r) = Recorded By, (t) = Taken By, (c) = Cosigned By    Initials Name Provider Type    RUPERTO Blair, PT Physical Therapist                Modalities       17 0900          Ice    Ice Applied Yes  -      Location anterior and posterior right knee  -GC      Rx Minutes 10 mins  -GC      Ice S/P Rx Yes  -        User Key   "(r) = Recorded By, (t) = Taken By, (c) = Cosigned By    Initials Name Provider Type    GC Hector Blair, PT Physical Therapist                Exercises       07/25/17 0900          Subjective Comments    Subjective Comments Pt reports his knee is feeling better wiht the squatting position, but it is still not as strong as his other knee.  -GC      Exercise 1    Exercise Name 1 Hamstring stretch  -GC      Reps 1 15  -GC      Time (Seconds) 1 10 secs  -GC      Exercise 2    Exercise Name 2 LAQ with Russian stim  -GC      Time (Minutes) 2 10 min 10/10  -GC      Exercise 3    Exercise Name 3 SLR  -GC      Cueing 3 Verbal  -GC      Reps 3 Other   50  -GC      Additional Comments 3#  -GC      Equipment 3 Cuff Weight  -GC      Weights/Plates 3 Other Weight   8#  -GC      Exercise 4    Exercise Name 4 Hip ADD  -GC      Cueing 4 Verbal  -GC      Reps 4 Other   50  -GC      Additional Comments 3#  -GC      Equipment 4 Cuff Weight  -GC      Weights/Plates 4 Other Weight   8#  -GC      Exercise 5    Exercise Name 5 SAQ  -GC      Cueing 5 Verbal  -GC      Reps 5 Other   100  -GC      Additional Comments 3#  -GC      Equipment 5 Cuff Weight  -GC      Weights/Plates 5 Other Weight   8#  -GC      Exercise 6    Exercise Name 6 Partial Squats/ball squeeze  -GC      Reps 6 Other   50  -GC      Equipment 6 Cuff Weight  -GC      Weights/Plates 6 8  -GC      Exercise 7    Exercise Name 7 TKE vs gold theraband  -GC      Reps 7 Other   50  -GC      Equipment 7 Theraband  -GC      Resistance 7 Other (comment)   gold  -GC      Exercise 8    Exercise Name 8 Lateral Dips 2\" step  -GC      Cueing 8 Verbal  -GC      Reps 8 Other   50  -GC      Equipment 8 Other   2 inch step  -GC      Exercise 9    Exercise Name 9 Cable Walk-back and to both sides  -GC      Cueing 9 Verbal  -GC      Reps 9 5  -GC      Additional Comments 25#  -GC      Exercise 10    Exercise Name 10 Lunges  -GC      Cueing 10 Verbal  -GC      Reps 10 Other   40 alternately  -GC  "       User Key  (r) = Recorded By, (t) = Taken By, (c) = Cosigned By    Initials Name Provider Type    GC Hector Blair, PT Physical Therapist                                       Time Calculation:   Stop Time: 1005    Therapy Charges for Today     Code Description Service Date Service Provider Modifiers Qty    67552723309  PT HOT OR COLD PACK TREAT MCARE 7/25/2017 Hector Blair, PT GP 1    01836248392 HC PT THER PROC EA 15 MIN 7/25/2017 Hector Blair, PT GP 2                    Hector Blair, PT  7/25/2017

## 2017-08-01 ENCOUNTER — APPOINTMENT (OUTPATIENT)
Dept: PHYSICAL THERAPY | Facility: HOSPITAL | Age: 47
End: 2017-08-01

## 2017-08-03 ENCOUNTER — HOSPITAL ENCOUNTER (OUTPATIENT)
Dept: PHYSICAL THERAPY | Facility: HOSPITAL | Age: 47
Setting detail: THERAPIES SERIES
Discharge: HOME | End: 2017-08-03

## 2017-08-03 DIAGNOSIS — S83.511D RUPTURE OF ANTERIOR CRUCIATE LIGAMENT OF RIGHT KNEE, SUBSEQUENT ENCOUNTER: Primary | ICD-10-CM

## 2017-08-03 PROCEDURE — 97110 THERAPEUTIC EXERCISES: CPT | Performed by: PHYSICAL THERAPIST

## 2017-08-03 NOTE — THERAPY TREATMENT NOTE
Outpatient Physical Therapy Ortho Treatment Note   Felisa Khan     Patient Name: Ruperto Michelle  : 1970  MRN: [id number]  Today's Date: 8/3/2017      Visit Date: 2017    Visit Dx:    ICD-10-CM ICD-9-CM   1. Rupture of anterior cruciate ligament of right knee, subsequent encounter S83.511D 844.2       Patient Active Problem List   Diagnosis   • Rupture of anterior cruciate ligament of right knee   • Status post repair of anterior cruciate ligament, right   • Patellofemoral stress syndrome of right knee        Past Medical History:   Diagnosis Date   • Back pain     LOWER   • High cholesterol    • Right knee pain     SCHEDULED FOR SX        Past Surgical History:   Procedure Laterality Date   • KNEE ACL RECONSTRUCTION Right 4/10/2017    Procedure: KNEE ANTERIOR CRUCIATE LIGAMENT RECONSTRUCTION WITH ALLOGRAFT MEDIDAL FEMORAL CONDYLE CHRONDROPLASTY;  Surgeon: Rudy Gastelum MD;  Location: [facility];  Service:    • VASECTOMY               PT Ortho       17 0855    Subjective Comments    Subjective Comments Pt states his knee is feeling better.  -    Subjective Pain    Able to rate subjective pain? yes  -    Pre-Treatment Pain Level 0  -    Right Knee    Knee Extension Gross Movement (5/5) normal  -    Knee Flexion Gross Movement (5/5) normal  -      User Key  (r) = Recorded By, (t) = Taken By, (c) = Cosigned By    Initials Name Provider Type    RUPERTO Blair PT Physical Therapist                            PT Assessment/Plan       17 0855       PT Assessment    Assessment Comments Pt is doing very well wtih improved strength and function.  -     PT Plan    PT Plan Comments Pt is to continue his HEP daily. Re-ck in approximately 2 weeks with likely discharge.  -       User Key  (r) = Recorded By, (t) = Taken By, (c) = Cosigned By    Initials Name Provider Type    RUPERTO Blair PT Physical Therapist                Modalities       17 0855          Ice    Ice  "Applied Yes  -GC      Location anterior and posterior right knee  -GC      Rx Minutes 10 mins  -GC      Ice S/P Rx Yes  -GC        User Key  (r) = Recorded By, (t) = Taken By, (c) = Cosigned By    Initials Name Provider Type    GC Hector Blair, PT Physical Therapist                Exercises       08/03/17 0855          Subjective Comments    Subjective Comments Pt states his knee is feeling better.  -GC      Subjective Pain    Able to rate subjective pain? yes  -GC      Pre-Treatment Pain Level 0  -GC      Exercise 1    Exercise Name 1 Hamstring stretch  -GC      Reps 1 15  -GC      Time (Seconds) 1 10 secs  -GC      Exercise 2    Exercise Name 2 LAQ/ball squeeze  -GC      Reps 2 --   50  -GC      Additional Comments 4#  -GC      Exercise 3    Exercise Name 3 SLR  -GC      Cueing 3 Verbal  -GC      Reps 3 Other   50  -GC      Additional Comments 4#  -GC      Equipment 3 Cuff Weight  -GC      Weights/Plates 3 Other Weight   8#  -GC      Exercise 4    Exercise Name 4 Hip ADD  -GC      Cueing 4 Verbal  -GC      Reps 4 Other   50  -GC      Additional Comments 4#  -GC      Equipment 4 Cuff Weight  -GC      Weights/Plates 4 Other Weight   8#  -GC      Exercise 5    Exercise Name 5 SAQ  -GC      Cueing 5 Verbal  -GC      Reps 5 Other   100  -GC      Additional Comments 4#  -GC      Equipment 5 Cuff Weight  -GC      Weights/Plates 5 Other Weight   8#  -GC      Exercise 6    Exercise Name 6 Partial Squats/ball squeeze  -GC      Reps 6 Other   50  -GC      Equipment 6 Cuff Weight  -GC      Weights/Plates 6 8  -GC      Exercise 7    Exercise Name 7 TKE vs gold theraband  -GC      Reps 7 Other   50  -GC      Equipment 7 Theraband  -GC      Resistance 7 Other (comment)   gold  -GC      Exercise 8    Exercise Name 8 Lateral Dips 4\" step  -GC      Cueing 8 Verbal  -GC      Reps 8 25  -GC      Equipment 8 Other   2 inch step  -GC      Exercise 9    Exercise Name 9 Cable Walk-back and to both sides  -GC      Cueing 9 Verbal  -GC   "    Reps 9 5  -GC      Exercise 10    Exercise Name 10 Lunges  -GC      Cueing 10 Verbal  -GC      Reps 10 Other   40 alternately  -GC        User Key  (r) = Recorded By, (t) = Taken By, (c) = Cosigned By    Initials Name Provider Type    GC Hector Blair, PT Physical Therapist                                       Time Calculation:   Start Time: 0855  Stop Time: 0954  Time Calculation (min): 59 min    Therapy Charges for Today     Code Description Service Date Service Provider Modifiers Qty    80124994909  PT HOT OR COLD PACK TREAT MCARE 8/3/2017 Hector Blair, PT GP 1    [id number]  PT THER PROC EA 15 MIN 8/3/2017 Hector Blair, PT GP 1                    Hector Blair PT  8/3/2017

## 2017-08-17 ENCOUNTER — APPOINTMENT (OUTPATIENT)
Dept: PHYSICAL THERAPY | Facility: HOSPITAL | Age: 47
End: 2017-08-17

## 2017-08-22 ENCOUNTER — OFFICE VISIT (OUTPATIENT)
Dept: ORTHOPEDIC SURGERY | Facility: CLINIC | Age: 47
End: 2017-08-22

## 2017-08-22 ENCOUNTER — HOSPITAL ENCOUNTER (OUTPATIENT)
Dept: PHYSICAL THERAPY | Facility: HOSPITAL | Age: 47
Setting detail: THERAPIES SERIES
Discharge: HOME | End: 2017-08-22

## 2017-08-22 DIAGNOSIS — Z98.890 STATUS POST REPAIR OF ANTERIOR CRUCIATE LIGAMENT: Primary | ICD-10-CM

## 2017-08-22 DIAGNOSIS — M22.2X1 PATELLOFEMORAL STRESS SYNDROME OF RIGHT KNEE: ICD-10-CM

## 2017-08-22 DIAGNOSIS — S83.511D RUPTURE OF ANTERIOR CRUCIATE LIGAMENT OF RIGHT KNEE, SUBSEQUENT ENCOUNTER: Primary | ICD-10-CM

## 2017-08-22 PROCEDURE — 73562 X-RAY EXAM OF KNEE 3: CPT | Performed by: ORTHOPAEDIC SURGERY

## 2017-08-22 PROCEDURE — 99212 OFFICE O/P EST SF 10 MIN: CPT | Performed by: ORTHOPAEDIC SURGERY

## 2017-08-22 NOTE — THERAPY DISCHARGE NOTE
Outpatient Physical Therapy Ortho Progress Note/Discharge Summary   Felisa Khan     Patient Name: Ruperto Michelle  : 1970  MRN: [id number]  Today's Date: 2017      Visit Date: 2017    Visit Dx:    ICD-10-CM ICD-9-CM   1. Rupture of anterior cruciate ligament of right knee, subsequent encounter S83.511D 844.2       Patient Active Problem List   Diagnosis   • Rupture of anterior cruciate ligament of right knee   • Status post repair of anterior cruciate ligament, right   • Patellofemoral stress syndrome of right knee        Past Medical History:   Diagnosis Date   • Back pain     LOWER   • High cholesterol    • Right knee pain     SCHEDULED FOR SX        Past Surgical History:   Procedure Laterality Date   • KNEE ACL RECONSTRUCTION Right 4/10/2017    Procedure: KNEE ANTERIOR CRUCIATE LIGAMENT RECONSTRUCTION WITH ALLOGRAFT MEDIDAL FEMORAL CONDYLE CHRONDROPLASTY;  Surgeon: Rudy Gastelum MD;  Location: [facility];  Service:    • VASECTOMY               PT Ortho       17 0845    Subjective Comments    Subjective Comments Pt has no complaints of pain or difficulty with his ADLs.  -    Subjective Pain    Able to rate subjective pain? yes  -    Pre-Treatment Pain Level 0  -    Knee Special Tests    Anterior drawer (ACL lesion) Right:;Negative  -    Right Knee    Extension/Flexion AROM Deficit 0-140 degrees  -    Right Hip    Hip Flexion Gross Movement (5/5) normal  -GC    Hip Extension Gross Movement (5/5) normal  -GC    Hip ABduction Gross Movement (5/5) normal  -GC    Hip ADduction Gross Movement (5/5) normal  -    Right Knee    Knee Extension Gross Movement (5/5) normal  -GC    Knee Flexion Gross Movement (5/5) normal  -    Right Ankle/Foot    Ankle PF Gross Movement (5/5) normal  -    Ankle Dorsiflexion Gross Movement (5/5) normal  -GC    Transfers    Transfer, Comment Pt ambulates nomrlly on levels and stairs  -      User Key  (r) = Recorded By, (t) = Taken By, (c) =  Cosigned By    Initials Name Provider Type    RUPERTO Banday Johnnie, PT Physical Therapist                            PT Assessment/Plan       08/22/17 0845       PT Assessment    Assessment Comments Pt is doing well with all goals met and no limitations with ADLs.   -     PT Plan    PT Plan Comments Pt is to continue his HEP 3-4x weekly for maintenance purposes.  -GC       User Key  (r) = Recorded By, (t) = Taken By, (c) = Cosigned By    Initials Name Provider Type    RUPERTO Blair, PT Physical Therapist                    Exercises       08/22/17 0845          Subjective Comments    Subjective Comments Pt has no complaints of pain or difficulty with his ADLs.  -      Subjective Pain    Able to rate subjective pain? yes  -      Pre-Treatment Pain Level 0  -        User Key  (r) = Recorded By, (t) = Taken By, (c) = Cosigned By    Initials Name Provider Type    RUPERTO Banday Johnnie, PT Physical Therapist                               PT OP Goals       08/22/17 0845       PT Short Term Goals    STG Date to Achieve 05/11/17  -     STG 1 Decrease right knee pain with activity to 3-4/10.  -GC     STG 1 Progress Met  -     STG 2 Increase AROM of right knee to 0-110 degrees with testing.  -     STG 2 Progress Met  -     STG 3 Increase right LE strength to at least 4/5 all planes with testing.  -     STG 3 Progress Met  -     STG 4 Decrease effusion right knee to WFL with testing.  -     STG 4 Progress Met  -     STG 5 Pt will be independent with his HEP issued by this therapist.  -     STG 5 Progress Met  -     Long Term Goals    LTG Date to Achieve 06/08/17  -     LTG 1 Decrease right knee pain with activity to 0-1/10.  -     LTG 1 Progress Met  -     LTG 2 Increase AROM right knee to 0-130 degrees with testing.  -     LTG 2 Progress Met  -     LTG 3 Increase right LE strength to 5/5 all planes with testing.  -     LTG 3 Progress Ongoing;Progressing;Partially Met  -     LTG 4 Restore  normal gait on levels and stairs without assistive device.  -     LTG 4 Progress Met  -GC     LTG 5 Pt will be indpendent with all ADLs and have a LEFS score > 65.  -     LTG 5 Progress Partially Met  -GC       User Key  (r) = Recorded By, (t) = Taken By, (c) = Cosigned By    Initials Name Provider Type    RUPERTO Blair, PT Physical Therapist                    Time Calculation:   Start Time: 0845  Stop Time: 0850  Time Calculation (min): 5 min                     Hector Blair, PT  8/22/2017

## 2017-08-22 NOTE — PROGRESS NOTES
Subjective:     Patient ID: Ruperto Michelle is a 46 y.o. male.    Chief Complaint:  Follow-up status post right knee ACL reconstruction with bone patellar tendon bone allograft, medial femoral condyle chondroplasty, 4/10/2017  History of Present Illness  Ruperto Michelle returns to clinic today for evaluation of right knee, states he is doing much better after injection 6 weeks ago to his knee, has minimal to no pain, minimal irritation over his patellar tendon with deep flexion activities.  Denies any maximilian locking or catching of his knee.  Minimal intermittent swelling noted.  States she has been able to run up and down stairs recently with minimal issues.  He has completed physical therapy at this time.  Denies any buckling or giving way.     Social History     Occupational History   • Not on file.     Social History Main Topics   • Smoking status: Never Smoker   • Smokeless tobacco: Never Used   • Alcohol use Yes      Comment: SOCIALLY   • Drug use: No   • Sexual activity: Defer      Past Medical History:   Diagnosis Date   • Back pain     LOWER   • High cholesterol    • Right knee pain     SCHEDULED FOR SX     Past Surgical History:   Procedure Laterality Date   • KNEE ACL RECONSTRUCTION Right 4/10/2017    Procedure: KNEE ANTERIOR CRUCIATE LIGAMENT RECONSTRUCTION WITH ALLOGRAFT MEDIDAL FEMORAL CONDYLE CHRONDROPLASTY;  Surgeon: Rudy Gastelum MD;  [facility];  Service:    • VASECTOMY         No family history on file.      Review of Systems   Constitutional: Negative for chills, diaphoresis, fever and unexpected weight change.   HENT: Negative for hearing loss, nosebleeds, sore throat and tinnitus.    Eyes: Negative for pain and visual disturbance.   Respiratory: Negative for cough, shortness of breath and wheezing.    Cardiovascular: Negative for chest pain and palpitations.   Gastrointestinal: Negative for abdominal pain, diarrhea, nausea and vomiting.   Endocrine: Negative for cold intolerance,  heat intolerance and polydipsia.   Genitourinary: Negative for difficulty urinating, dysuria and hematuria.   Musculoskeletal: Negative for arthralgias, joint swelling and myalgias.   Skin: Negative for rash and wound.   Allergic/Immunologic: Negative for environmental allergies.   Neurological: Negative for dizziness, syncope and numbness.   Hematological: Does not bruise/bleed easily.   Psychiatric/Behavioral: Negative for dysphoric mood and sleep disturbance. The patient is not nervous/anxious.            Objective:  There were no vitals filed for this visit.  There were no vitals filed for this visit.  There is no height or weight on file to calculate BMI.  General: No acute distress.  Resp: normal respiratory effort  Skin: no rashes or wounds; normal turgor  Psych: mood and affect appropriate; recent and remote memory intact         Ortho Exam    Right knee-active range of motion 0-135°, 4+ out of 5 strength on flexion and extension, minimal effusion.  Grade 1A Lachman, negative anterior posterior drawer, stable to varus and valgus stress at 0 and 30°.  Minimal tenderness on active patellar compression test, no focal tenderness over patellar tendon.  Incision well-healed.  Minimal effusion.    Imaging:  3 view x-rays right knee from today's visit, ordered and reviewed by me, indication status post ACL reconstruction, compared to prior postoperative x-rays.  Bone plugs from each side of ACL graft appeared to be in stable position with good fixation into the femoral and tibial tunnels, no significant evidence of tunnel widening, no evidence of intra-articular loose bodies.  Moderate medial compartment joint space narrowing with no significant change in comparison to prior films.    Assessment:       1. Status post repair of anterior cruciate ligament, right    2. Patellofemoral stress syndrome of right knee          Plan:  ELSY query complete.          Discussed treatment options at length with patient at  today's visit. Patient is doing much better with patellofemoral pain that he had at last visit following cortisone injection, recommended continue hip, core, quad strengthening program.  Follow-up in 4 months to assess activity and function, if he does have residual pain he can return sooner for consideration of repeat injection versus viscous supplement series.    Ruperto Michelle was in agreement with plan and had all questions answered.     Orders:  Orders Placed This Encounter   Procedures   • XR Knee 3 View Right       Medications:  No orders of the defined types were placed in this encounter.      Followup:  Return in about 4 months (around 12/22/2017).          Dragon transcription disclaimer     Much of this encounter note is an electronic transcription/translation of spoken language to printed text. The electronic translation of spoken language may permit erroneous, or at times, nonsensical words or phrases to be inadvertently transcribed. Although I have reviewed the note for such errors, some may still exist.

## 2017-12-05 ENCOUNTER — OFFICE VISIT (OUTPATIENT)
Dept: ORTHOPEDIC SURGERY | Facility: CLINIC | Age: 47
End: 2017-12-05

## 2017-12-05 DIAGNOSIS — M75.82 TENDINITIS OF LEFT ROTATOR CUFF: ICD-10-CM

## 2017-12-05 DIAGNOSIS — M22.2X1 PATELLOFEMORAL STRESS SYNDROME OF RIGHT KNEE: ICD-10-CM

## 2017-12-05 DIAGNOSIS — Z98.890 STATUS POST REPAIR OF ANTERIOR CRUCIATE LIGAMENT: Primary | ICD-10-CM

## 2017-12-05 PROCEDURE — 99213 OFFICE O/P EST LOW 20 MIN: CPT | Performed by: ORTHOPAEDIC SURGERY

## 2017-12-05 RX ORDER — ATORVASTATIN CALCIUM 20 MG/1
20 TABLET, FILM COATED ORAL
COMMUNITY
Start: 2017-10-05 | End: 2018-10-05

## 2017-12-05 NOTE — PROGRESS NOTES
Subjective:     Patient ID: Ruperto Michelle is a 47 y.o. male.    Chief Complaint:  Follow-up status post right knee ACL reconstruction with bone patellar tendon bone allograft, medial femoral condyle chondroplasty, 4/10/2017    New issue left shoulder pain  History of Present Illness  Ruperto Michelle returns to clinic today for evaluation of right knee, states is doing very well this point time, notes minimal irritation and primarily only with deep flexion activities on leg press and squats, he is navigating stairs with minimal irritation at this time.  He did have 1 episode while playing golf noted a sharp pain while rotating but denies any buckling, catching, locking, or giving way.    Patient is also presents today for evaluation of left shoulder pain localized to the anterior aspect of shoulder rates as a 5 out of 10 when he has noticed over the last 4-6 weeks, denies any specific injury.  Pain is exacerbated with resisted internal rotation and adduction activities of the shoulder, mild improvement with rest and soft tissue massage.  Denies any associated numbness or tingling, denies radiation of pain from the shoulder.     Social History     Occupational History   • Not on file.     Social History Main Topics   • Smoking status: Never Smoker   • Smokeless tobacco: Never Used   • Alcohol use Yes      Comment: SOCIALLY   • Drug use: No   • Sexual activity: Defer      Past Medical History:   Diagnosis Date   • Back pain     LOWER   • High cholesterol    • Right knee pain     SCHEDULED FOR SX     Past Surgical History:   Procedure Laterality Date   • KNEE ACL RECONSTRUCTION Right 4/10/2017    Procedure: KNEE ANTERIOR CRUCIATE LIGAMENT RECONSTRUCTION WITH ALLOGRAFT MEDIDAL FEMORAL CONDYLE CHRONDROPLASTY;  Surgeon: Rudy Gastelum MD;  Location: [facility];  Service:    • VASECTOMY         No family history on file.      Review of Systems   Constitutional: Negative for chills, diaphoresis, fever and unexpected  weight change.   HENT: Negative for hearing loss, nosebleeds, sore throat and tinnitus.    Eyes: Negative for pain and visual disturbance.   Respiratory: Negative for cough, shortness of breath and wheezing.    Cardiovascular: Negative for chest pain and palpitations.   Gastrointestinal: Negative for abdominal pain, diarrhea, nausea and vomiting.   Endocrine: Negative for cold intolerance, heat intolerance and polydipsia.   Genitourinary: Negative for difficulty urinating, dysuria and hematuria.   Musculoskeletal: Positive for arthralgias. Negative for joint swelling and myalgias.   Skin: Negative for rash and wound.   Allergic/Immunologic: Negative for environmental allergies.   Neurological: Negative for dizziness, syncope and numbness.   Hematological: Does not bruise/bleed easily.   Psychiatric/Behavioral: Negative for dysphoric mood and sleep disturbance. The patient is not nervous/anxious.    All other systems reviewed and are negative.          Objective:  There were no vitals filed for this visit.  There were no vitals filed for this visit.  There is no height or weight on file to calculate BMI.  General: No acute distress.  Resp: normal respiratory effort  Skin: no rashes or wounds; normal turgor  Psych: mood and affect appropriate; recent and remote memory intact       Ortho Exam    Right knee-active range of motion 0-135°, 5 out of 5 strength on flexion and extension, no effusion noted.  No medial or lateral joint line pain, minimal tenderness over medial lateral patellar facet with minimally positive active patellar compression test.  Grade 1A Lachman, negative anterior posterior drawer, stable to varus and valgus stress at 0 and 30°.    Left shoulder-maximal tenderness anteriorly over biceps tendon and subscap insertion on the lesser tuberosity, 4 minus out of 5 strength on belly press test with a positive bear hug sign, mildly positive Yergason and speed's, negative Echo's, negative drop arm test,  negative external rotation lag sign.  Active forward flexion 175°, external rotation 60°, internal rotation T12.  Positive sensation light touch all distributions left hand symmetric to the right.    Imaging:    Assessment:       1. Status post repair of anterior cruciate ligament, right    2. Patellofemoral stress syndrome of right knee    3. Tendinitis of left rotator cuff          Plan:  ELSY query complete.          Discussed treatment options at length with patient at today's visit. Patient is doing very well with right knee at this time, continue activities as tolerated, avoiding deep flexion and resisted activities in order to avoid flaring up his patellofemoral pain.  In regards to his shoulder, we will start with anti-inflammatory medications over-the-counter as well as work with therapy on modalities and strengthening of his subscap.  Follow-up in 4-6 weeks, still having issues at that time we will obtain x-rays and likely consider MRI to evaluate for subscap tear.    Ruperto Michelle was in agreement with plan and had all questions answered.     Orders:  Orders Placed This Encounter   Procedures   • Ambulatory Referral to Physical Therapy Evaluate and treat, Ortho       Medications:  No orders of the defined types were placed in this encounter.      Followup:  Return in about 6 weeks (around 1/16/2018).    Ruperto was seen today for follow-up and pain.    Diagnoses and all orders for this visit:    Status post repair of anterior cruciate ligament, right    Patellofemoral stress syndrome of right knee    Tendinitis of left rotator cuff  -     Ambulatory Referral to Physical Therapy Evaluate and treat, Ortho        Dragon transcription disclaimer     Much of this encounter note is an electronic transcription/translation of spoken language to printed text. The electronic translation of spoken language may permit erroneous, or at times, nonsensical words or phrases to be inadvertently transcribed. Although I  have reviewed the note for such errors, some may still exist.

## 2017-12-12 ENCOUNTER — HOSPITAL ENCOUNTER (OUTPATIENT)
Dept: PHYSICAL THERAPY | Facility: HOSPITAL | Age: 47
Setting detail: THERAPIES SERIES
Discharge: HOME | End: 2017-12-12

## 2017-12-12 DIAGNOSIS — M75.82 TENDINITIS OF LEFT ROTATOR CUFF: Primary | ICD-10-CM

## 2017-12-12 PROCEDURE — 97161 PT EVAL LOW COMPLEX 20 MIN: CPT | Performed by: PHYSICAL THERAPIST

## 2017-12-13 NOTE — THERAPY EVALUATION
Outpatient Physical Therapy Ortho Initial Evaluation   [place]     Patient Name: Ruperto Michelle  : 1970  MRN: [id number]  Today's Date: 2017      Visit Date: 2017    Patient Active Problem List   Diagnosis   • Rupture of anterior cruciate ligament of right knee   • Status post repair of anterior cruciate ligament, right   • Patellofemoral stress syndrome of right knee   • Tendinitis of left rotator cuff        Past Medical History:   Diagnosis Date   • Back pain     LOWER   • High cholesterol    • Right knee pain     SCHEDULED FOR SX        Past Surgical History:   Procedure Laterality Date   • KNEE ACL RECONSTRUCTION Right 4/10/2017    Procedure: KNEE ANTERIOR CRUCIATE LIGAMENT RECONSTRUCTION WITH ALLOGRAFT MEDIDAL FEMORAL CONDYLE CHRONDROPLASTY;  Surgeon: Rudy Gastelum MD;  Location: [facility] OR;  Service:    • VASECTOMY         Visit Dx:     ICD-10-CM ICD-9-CM   1. Tendinitis of left rotator cuff M75.82 726.10             Patient History       17 1000          History    Chief Complaint Difficulty with daily activities;Pain;Muscle weakness  -      Type of Pain Shoulder pain   left  -GC      Date Current Problem(s) Began 10/13/17  -GC      Brief Description of Current Complaint Pt reports he has had left posterior shoulder pain possibly caused by working out, particularly doing flys. He was seen by Dr. Gastelum who diagnosed him with RC tendinitis and referred him to therapy.  -GC      Patient/Caregiver Goals Relieve pain;Return to prior level of function;Improve strength  -      Patient's Rating of General Health Good  -GC      Hand Dominance right-handed  -GC      Occupation/sports/leisure activities consultant, pt enjoys cooking and golf  -      Pain     Pain Location Shoulder   left posterior shoulder  -GC      Pain at Present 0  -GC      Pain at Best 0  -GC      Pain at Worst 3  -GC      Pain Frequency Intermittent  -GC      Pain Description  Aching;Discomfort;Dull;Tightness  -GC      What Performance Factors Make the Current Problem(s) WORSE? Pt c/o pain with lifting tasks, lying on his left side, working out  -GC      What Performance Factors Make the Current Problem(s) BETTER? Pt feels better if he does not lift with his left UE  -GC      Difficulties with ADL's? Pt has some pain when lying on his left side  -GC      Difficulties with recreational activities? Pt is unable to workout the way he would like  -GC      Fall Risk Assessment    Any falls in the past year: No  -GC      Daily Activities    Primary Language English  -GC      How does patient learn best? Reading  -GC      Teaching needs identified Home Exercise Program;Management of Condition  -GC      Patient is concerned about/has problems with Performing sports, recreation, and play activities;Reaching over head;Repetitive movements of the hand, arm, shoulder;Grasping objects lifting  -GC      Does patient have problems with the following? None  -GC      Barriers to learning None  -GC      Pt Participated in POC and Goals Yes  -GC      Safety    Are you being hurt, hit, or frightened by anyone at home or in your life? No  -GC      Are you being neglected by a caregiver No  -GC        User Key  (r) = Recorded By, (t) = Taken By, (c) = Cosigned By    Initials Name Provider Type    GC Hector Blair PT Physical Therapist                PT Ortho       12/12/17 1000    Posture/Observations    Forward Head Mild  -GC    Cervical Lordosis Decreased  -GC    Thoracic Kyphosis Normal  -GC    Rounded Shoulders Bilateral:;Mild  -GC    Scapular Elevation Bilateral:;Normal  -GC    Scapular winging Left:;Mild  -GC    Shoulder Impingement/Rotator Cuff Special Tests    Lund-Devon Test (RC Lesion vs. Bursitis) Right:;Positive  -GC    Empty Can Test (RC Lesion) Right:;Negative  -GC    Shoulder Laxity/Instability Special Tests    Load and Shift Test Right:;Negative  -GC    Sulcus Sign, 0 Degrees  Right:;Negative  -GC    Biceps/Labral Special Tests    [place]'s Test (Labral Test) Right:;Negative  -GC    Left Shoulder    Flexion AROM within functional limits  -GC    Extension AROM within functional limits  -GC    ABduction AROM within functional limits  -GC    External Rotation AROM within functional limits  -GC    Internal Rotation AROM Deficit 71 degrees  -GC    Left Elbow/Forearm    Extension/Flexion AROM within functional limits  -GC    Left Shoulder    Flexion Gross Movement (5/5) normal  -GC    Extension Gross Movement (5/5) normal  -GC    ABduction Gross Movement (5/5) normal  -GC    Int Rotation Gross Movement (5/5) normal  -GC    Ext Rotation Gross Movement (4+/5) good plus  -GC    Left Scapula    Scapular Elevation Gross Movement (5/5) normal  -GC    Scapular ADduction Middle Trapezius (4/5) good  -GC    Scapular ADd&Depression Lwr Trapezius (4/5) good  -GC    Scapular ADd&Med Rotation Rhomboid Major (4/5) good  -GC    Scapular ABd&Lat Rotation Serratus Ant (4/5) good  -GC    Scapula Manual Muscle Testing Detail scaption strength is 4/5  -GC    Left Elbow/Forearm    Elbow Flexion Gross Movement (5/5) normal  -GC    Elbow Extension Gross Movement (5/5) normal  -GC      User Key  (r) = Recorded By, (t) = Taken By, (c) = Cosigned By    Initials Name Provider Type    GC Hector Blair, PT Physical Therapist                      Therapy Education  Given: HEP, Symptoms/condition management, Pain management  Program: New  How Provided: Verbal, Demonstration, Written  Provided to: Patient  Level of Understanding: Teach back education performed, Verbalized, Demonstrated           PT OP Goals       12/12/17 1000       PT Short Term Goals    STG Date to Achieve 12/26/17  -     STG 1 Decrease left shoulder pain to 1-2/10 with activity.  -     STG 2 Increase left shoulder IR AROM to WFL with testing.  -     STG 3 Increase left shoudler girdle strength to at least 4+/5 all planes with testing.  -     STG 4  Pt will be independent with his HEP issued by this therapist.  -     Long Term Goals    LTG Date to Achieve 01/09/18  -     LTG 1 decrease left shoulder pain to 0-1/10 with activity.  -     LTG 2 Increase left shoulder girdle strength to 5/5 all planes with testing.  -     LTG 3 Pt will be indpendent with all ADLs and have a Quick Dash score < 5.  -     Time Calculation    PT Goal Re-Cert Due Date 01/09/18  -       User Key  (r) = Recorded By, (t) = Taken By, (c) = Cosigned By    Initials Name Provider Type     Hector Blair PT Physical Therapist                PT Assessment/Plan       12/12/17 1000       PT Assessment    Functional Limitations Limitations in functional capacity and performance;Performance in leisure activities  -     Impairments Range of motion;Pain;Muscle strength  -     Assessment Comments Pt presents with an approximate 2 month history of left shoulder pain with lifting tasks. He has pain rated up to 3/10 and has decreased left shoulder IR ROM, decreased left shoulder girdle strength, and decreased function secondary to the above.  -     Please refer to paper survey for additional self-reported information Yes  -GC     Rehab Potential Good  -     Patient/caregiver participated in establishment of treatment plan and goals Yes  -     Patient would benefit from skilled therapy intervention Yes  -     PT Plan    PT Frequency 1x/week;2x/week  -     Predicted Duration of Therapy Intervention (days/wks) 4 weeks  -     Planned CPT's? PT EVAL LOW COMPLEXITY: 08193;PT HOT OR COLD PACK TREAT MCARE;PT ELECTRICAL STIM UNATTEND: ;PT ULTRASOUND EA 15 MIN: 03476  -     PT Plan Comments Pt is to continue his HEP 2x daily  -       User Key  (r) = Recorded By, (t) = Taken By, (c) = Cosigned By    Initials Name Provider Type     Hector Blair, PT Physical Therapist                  Exercises       12/12/17 1000          Exercise 1    Exercise Name 1 Sidelying ER  -       Cueing 1 Verbal  -GC      Reps 1 25  -GC      Additional Comments 2#  -GC      Exercise 2    Exercise Name 2 Prone Hor ABD 90  -GC      Cueing 2 Verbal  -GC      Reps 2 25  -GC      Additional Comments 1#  -GC      Exercise 3    Exercise Name 3 Prone Hor   -GC      Cueing 3 Verbal  -GC      Reps 3 25  -GC      Additional Comments 1#  -GC      Exercise 4    Exercise Name 4 Scaption Down  -GC      Cueing 4 Verbal  -GC      Reps 4 25  -GC      Additional Comments 1#  -GC      Exercise 5    Exercise Name 5 Scaption Up  -GC      Cueing 5 Verbal  -GC      Reps 5 25  -GC      Additional Comments 2#  -GC      Exercise 6    Exercise Name 6 Rhomboid lift off  -GC      Cueing 6 Verbal  -GC      Reps 6 25  -GC      Additional Comments 2#  -GC      Exercise 7    Exercise Name 7 Serratus Punch  -GC      Cueing 7 Verbal  -GC      Reps 7 25  -GC      Additional Comments 5#  -GC      Exercise 8    Exercise Name 8 Sleeper Stretch  -GC      Cueing 8 Verbal  -GC      Reps 8 15  -GC      Time (Seconds) 8 10 secs  -GC        User Key  (r) = Recorded By, (t) = Taken By, (c) = Cosigned By    Initials Name Provider Type    RUPERTO Blair, PT Physical Therapist                        Quick DASH  Open a tight or new jar.: No Difficulty  Do heavy household chores (e.g., wash walls, wash floors): No Difficulty  Carry a shopping bag or briefcase: No Difficulty  Wash your back: Mild Difficulty  Use a knife to cut food: No Difficulty  Recreational activities in which you take some force or impact through your arm, should or hand (e.g. golf, hammering, tennis, etc.): No Difficulty  During the past week, to what extent has your arm, shoulder, or hand problem interfered with your normal social activites with family, friends, neighbors or groups?: Not at all  During the past week, were you limited in your work or other regular daily activities as a result of your arm, shoulder or hand problem?: Not limited at all  Arm, Shoulder, or hand pain:  Mild  Tingling (pins and needles) in your arm, shoulder, or hand: None  During the past week, how much difficulty have you had sleeping because of the pain in your arm, shoulder or hand?: Mild Difficulty  Number of Questions Answered: 11  Quick DASH Score: 6.82  Work Module (Optional)  Using your usual technique for your work?: No Difficulty  Doing your usual work because of arm, shoulder or hand pain?: No Difficulty  Doing your work as well as you would like?: No Difficulty  Spending your usual amount of time doing your work?: No Difficulty  Work Module Score: 0  Sports/Performing Arts Module (Optional)  Using your usual technique for playing your instrument or sport?: No Difficulty  Playing your musical instrument or sport because of arm, shoulder or hand pain?: No Difficulty  Playing your musical instrument or sport as well as you would like?: No Difficulty  Spending your usual amount of time practising or playing your instrument or sport?: No Difficulty  Sports/Performing Arts Score: 0         Time Calculation:   Start Time: 1000  Stop Time: 1043  Time Calculation (min): 43 min     Therapy Charges for Today     Code Description Service Date Service Provider Modifiers Qty    [id number] HC PT EVAL LOW COMPLEXITY 3 12/12/2017 Hector Blair, PT GP 1                    Hector Blair, PT  12/13/2017

## 2017-12-19 ENCOUNTER — HOSPITAL ENCOUNTER (OUTPATIENT)
Dept: PHYSICAL THERAPY | Facility: HOSPITAL | Age: 47
Setting detail: THERAPIES SERIES
Discharge: HOME | End: 2017-12-19

## 2017-12-19 DIAGNOSIS — M75.82 TENDINITIS OF LEFT ROTATOR CUFF: Primary | ICD-10-CM

## 2017-12-19 PROCEDURE — 97110 THERAPEUTIC EXERCISES: CPT | Performed by: PHYSICAL THERAPIST

## 2017-12-19 NOTE — THERAPY TREATMENT NOTE
Outpatient Physical Therapy Ortho Treatment Note   [facility]     Patient Name: Ruperto Michelle  : 1970  MRN: [id number]  Today's Date: 2017      Visit Date: 2017    Visit Dx:    ICD-10-CM ICD-9-CM   1. Tendinitis of left rotator cuff M75.82 726.10       Patient Active Problem List   Diagnosis   • Rupture of anterior cruciate ligament of right knee   • Status post repair of anterior cruciate ligament, right   • Patellofemoral stress syndrome of right knee   • Tendinitis of left rotator cuff        Past Medical History:   Diagnosis Date   • Back pain     LOWER   • High cholesterol    • Right knee pain     SCHEDULED FOR SX        Past Surgical History:   Procedure Laterality Date   • KNEE ACL RECONSTRUCTION Right 4/10/2017    Procedure: KNEE ANTERIOR CRUCIATE LIGAMENT RECONSTRUCTION WITH ALLOGRAFT MEDIDAL FEMORAL CONDYLE CHRONDROPLASTY;  Surgeon: Rudy Gastelum MD;  Location: [facility];  Service:    • VASECTOMY               PT Ortho       17 09    Subjective Comments    Subjective Comments Pt states his shoulder is feeling quite a bit better.  -    Subjective Pain    Able to rate subjective pain? yes  -    Pre-Treatment Pain Level 1  -    Left Shoulder    Internal Rotation AROM Deficit 82 degrees  -    Left Scapula    Scapular ADduction Middle Trapezius (4+/5) good plus  -    Scapular ADd&Depression Lwr Trapezius (4+/5) good plus  -    Scapular ADd&Med Rotation Rhomboid Major (4+/5) good plus  -    Scapular ABd&Lat Rotation Serratus Ant (4+/5) good plus  -    Scapula Manual Muscle Testing Detail Scaption strength is 4+/5  -      User Key  (r) = Recorded By, (t) = Taken By, (c) = Cosigned By    Initials Name Provider Type    RUPERTO Blair, PT Physical Therapist                            PT Assessment/Plan       17 0930       PT Assessment    Assessment Comments Pt is doing well with increased IR ROM, increased sholder girdle strength, and decreased c/o  pain.  -GC     PT Plan    PT Plan Comments Pt is to continue his HEP daily. Will call in 1-2 weeks to update progress.  -GC       User Key  (r) = Recorded By, (t) = Taken By, (c) = Cosigned By    Initials Name Provider Type    GC Hector Blair, PT Physical Therapist                    Exercises       12/19/17 0975          Subjective Comments    Subjective Comments Pt states his shoulder is feeling quite a bit better.  -GC      Subjective Pain    Able to rate subjective pain? yes  -GC      Pre-Treatment Pain Level 1  -GC      Exercise 1    Exercise Name 1 Sidelying ER  -GC      Cueing 1 Verbal  -GC      Reps 1 25  -GC      Additional Comments 3#  -GC      Exercise 2    Exercise Name 2 Prone Hor ABD 90  -GC      Cueing 2 Verbal  -GC      Reps 2 25  -GC      Additional Comments 3#  -GC      Exercise 3    Exercise Name 3 Prone Hor   -GC      Cueing 3 Verbal  -GC      Reps 3 25  -GC      Additional Comments 3#  -GC      Exercise 4    Exercise Name 4 Scaption Down  -GC      Cueing 4 Verbal  -GC      Reps 4 25  -GC      Additional Comments 3#  -GC      Exercise 5    Exercise Name 5 Scaption Up  -GC      Cueing 5 Verbal  -GC      Reps 5 25  -GC      Additional Comments 3#  -GC      Exercise 6    Exercise Name 6 Rhomboid lift off  -GC      Cueing 6 Verbal  -GC      Reps 6 25  -GC      Additional Comments 3#  -GC      Exercise 7    Exercise Name 7 Serratus Punch  -GC      Cueing 7 Verbal  -GC      Reps 7 25  -GC      Additional Comments 7#  -GC      Exercise 8    Exercise Name 8 Sleeper Stretch  -GC      Cueing 8 Verbal  -GC      Reps 8 15  -GC      Time (Seconds) 8 10 secs  -GC      Exercise 9    Exercise Name 9 Shoulder IR  -GC      Cueing 9 Verbal  -GC      Reps 9 25  -GC      Additional Comments silver  -GC      Exercise 10    Exercise Name 10 Shoulder Rows  -GC      Cueing 10 Verbal  -GC      Reps 10 25  -GC      Additional Comments silver  -GC        User Key  (r) = Recorded By, (t) = Taken By, (c) = Cosigned By     Initials Name Provider Type    RUPERTO Blair PT Physical Therapist                               PT OP Goals       12/19/17 0930       PT Short Term Goals    STG Date to Achieve 12/26/17  -GC     STG 1 Decrease left shoulder pain to 1-2/10 with activity.  -GC     STG 1 Progress Met  -GC     STG 2 Increase left shoulder IR AROM to WFL with testing.  -GC     STG 2 Progress Partially Met;Ongoing;Progressing  -GC     STG 3 Increase left shoudler girdle strength to at least 4+/5 all planes with testing.  -GC     STG 3 Progress Met  -GC     STG 4 Pt will be independent with his HEP issued by this therapist.  -GC     STG 4 Progress Met  -GC     Long Term Goals    LTG Date to Achieve 01/09/18  -GC     LTG 1 decrease left shoulder pain to 0-1/10 with activity.  -GC     LTG 1 Progress Partially Met;Ongoing;Progressing  -GC     LTG 2 Increase left shoulder girdle strength to 5/5 all planes with testing.  -GC     LTG 2 Progress Partially Met;Ongoing;Progressing  -GC     LTG 3 Pt will be indpendent with all ADLs and have a Quick Dash score < 5.  -GC     LTG 3 Progress Partially Met;Ongoing;Progressing  -GC       User Key  (r) = Recorded By, (t) = Taken By, (c) = Cosigned By    Initials Name Provider Type    RUPERTO Blair PT Physical Therapist                         Time Calculation:   Start Time: 0930  Stop Time: 0958  Time Calculation (min): 28 min    Therapy Charges for Today     Code Description Service Date Service Provider Modifiers Qty    99407038724 HC PT THER PROC EA 15 MIN 12/19/2017 Hector Blair, PT GP 1                    Hector Blair PT  12/19/2017

## 2018-05-11 NOTE — PLAN OF CARE
Problem: Patient Care Overview (Adult)  Goal: Plan of Care Review  Outcome: Ongoing (interventions implemented as appropriate)    04/10/17 0618   Coping/Psychosocial Response Interventions   Plan Of Care Reviewed With patient;spouse;sibling   Patient Care Overview   Progress no change   Outcome Evaluation   Outcome Summary/Follow up Plan Vital signs stable; ready for procedure       Goal: Adult Individualization and Mutuality  Outcome: Ongoing (interventions implemented as appropriate)    Problem: Perioperative Period (Adult)  Goal: Signs and Symptoms of Listed Potential Problems Will be Absent or Manageable (Perioperative Period)  Outcome: Ongoing (interventions implemented as appropriate)       Patient/surrogate refused vaccine

## 2018-05-29 ENCOUNTER — OFFICE VISIT (OUTPATIENT)
Dept: ORTHOPEDIC SURGERY | Facility: CLINIC | Age: 48
End: 2018-05-29

## 2018-05-29 DIAGNOSIS — M22.41 PATELLA, CHONDROMALACIA, RIGHT: ICD-10-CM

## 2018-05-29 DIAGNOSIS — Z98.890 STATUS POST REPAIR OF ANTERIOR CRUCIATE LIGAMENT: Primary | ICD-10-CM

## 2018-05-29 DIAGNOSIS — M22.2X1 PATELLOFEMORAL STRESS SYNDROME OF RIGHT KNEE: ICD-10-CM

## 2018-05-29 PROCEDURE — 99213 OFFICE O/P EST LOW 20 MIN: CPT | Performed by: ORTHOPAEDIC SURGERY

## 2018-05-29 PROCEDURE — 73562 X-RAY EXAM OF KNEE 3: CPT | Performed by: ORTHOPAEDIC SURGERY

## 2018-05-29 RX ORDER — MELOXICAM 15 MG/1
15 TABLET ORAL DAILY
Qty: 30 TABLET | Refills: 0 | Status: SHIPPED | OUTPATIENT
Start: 2018-05-29 | End: 2018-06-25 | Stop reason: SDUPTHER

## 2018-06-14 ENCOUNTER — TELEPHONE (OUTPATIENT)
Dept: ORTHOPEDIC SURGERY | Facility: CLINIC | Age: 48
End: 2018-06-14

## 2018-06-14 NOTE — TELEPHONE ENCOUNTER
Meghann has denied visco injections for patient based on not having a diagnosis of osteoarthritis of the knee will let patient know he can come in for a cortisone in the meantime, and we will resubmit if he fails that.

## 2018-06-25 RX ORDER — MELOXICAM 15 MG/1
15 TABLET ORAL DAILY
Qty: 90 TABLET | Refills: 0 | Status: SHIPPED | OUTPATIENT
Start: 2018-06-25 | End: 2018-10-01 | Stop reason: SDUPTHER

## 2018-06-29 ENCOUNTER — OFFICE VISIT (OUTPATIENT)
Dept: ORTHOPEDIC SURGERY | Facility: CLINIC | Age: 48
End: 2018-06-29

## 2018-06-29 VITALS — BODY MASS INDEX: 34.86 KG/M2 | HEIGHT: 68 IN | WEIGHT: 230 LBS

## 2018-06-29 DIAGNOSIS — M22.41 PATELLA, CHONDROMALACIA, RIGHT: Primary | ICD-10-CM

## 2018-06-29 PROCEDURE — 0232T NJX PLATELET PLASMA: CPT | Performed by: ORTHOPAEDIC SURGERY

## 2018-06-29 NOTE — PROGRESS NOTES
"PRP Knee Injection Procedure Note    Right knee PRP injection was discussed with the patient in detail, including indication, risks, benefits, and alternatives. Verbal consent was given for the procedure. Injection was performed by MD.   Aspirate of 10 cc of blood from left antecubital vein was obtained by MA, vial was placed in centrifuge and spun according to protocol.  4 cc of PRP supernatant was obtained.  Injection site was identified by physical examination and cleaned with Betadine and alcohol swabs. Prior to needle insertion, ethyl chloride spray was used for surface anesthesia. Sterile technique was used.  A 25-gauge, 1.5\" needle was directed to the joint from a(n) lateral and superior approach. Injectate was passed into the joint space without difficulty. The needle was removed and a simple bandage was applied. The procedure was tolerated well without difficulty.    Recommended protected weightbearing for the next 3-5 days, hold off on any impact loading activities for 3-4 weeks, may begin light lower body strengthening exercises again in 2 weeks from now.  All questions answered.      "

## 2018-10-01 RX ORDER — MELOXICAM 15 MG/1
TABLET ORAL
Qty: 90 TABLET | Refills: 0 | Status: SHIPPED | OUTPATIENT
Start: 2018-10-01 | End: 2019-09-09

## 2019-09-09 ENCOUNTER — OFFICE VISIT (OUTPATIENT)
Dept: ORTHOPEDIC SURGERY | Facility: CLINIC | Age: 49
End: 2019-09-09

## 2019-09-09 ENCOUNTER — TELEPHONE (OUTPATIENT)
Dept: ORTHOPEDIC SURGERY | Facility: CLINIC | Age: 49
End: 2019-09-09

## 2019-09-09 VITALS
BODY MASS INDEX: 34.86 KG/M2 | HEIGHT: 68 IN | SYSTOLIC BLOOD PRESSURE: 128 MMHG | HEART RATE: 69 BPM | WEIGHT: 230 LBS | DIASTOLIC BLOOD PRESSURE: 86 MMHG

## 2019-09-09 DIAGNOSIS — R52 PAIN: Primary | ICD-10-CM

## 2019-09-09 DIAGNOSIS — S46.211A BICEPS STRAIN, RIGHT, INITIAL ENCOUNTER: ICD-10-CM

## 2019-09-09 PROCEDURE — 99214 OFFICE O/P EST MOD 30 MIN: CPT | Performed by: ORTHOPAEDIC SURGERY

## 2019-09-09 PROCEDURE — 73080 X-RAY EXAM OF ELBOW: CPT | Performed by: ORTHOPAEDIC SURGERY

## 2019-09-09 RX ORDER — NAPROXEN 500 MG/1
TABLET ORAL
COMMUNITY
Start: 2019-09-07 | End: 2019-09-20

## 2019-09-09 RX ORDER — ATORVASTATIN CALCIUM 20 MG/1
TABLET, FILM COATED ORAL
COMMUNITY
Start: 2019-08-21 | End: 2019-11-05 | Stop reason: ALTCHOICE

## 2019-09-09 NOTE — TELEPHONE ENCOUNTER
Patients wife called, stated they were at [employer] this weekend and her  was lifting a heavy box out of the cart, into the car and felt a sharp pain in his R arm. Went to [facility]. They did not do any xrays, but told him he might have torn something and should follow up with his orthopedist. He wants to see you, so when would you like to see him?

## 2019-09-09 NOTE — PROGRESS NOTES
Subjective:     Patient ID: Ruperto Michelle is a 48 y.o. male.    Chief Complaint:  Right elbow pain, new issue to examiner  History of Present Illness  Ruperto Michelle presents to clinic today for evaluation of right elbow pain that started acutely over the weekend when he was lifting a box at [employer], he was trying to shove the box into his vehicle and noticed sudden onset of sharp pain over the anterior aspect of his right elbow which is persisted since the time of his injury a few days ago.  He is also noted some deformity to the anterior aspect of his right arm.  He rates associated pain in this region now is a 5-6 out of 10, initially was a 7-8 out of 10, still notes increased pain on any attempts at resisted elbow flexion but particularly also with any supination activities.  Did note some intermittent tingling down to his hand which is largely resolved at this time.  He is also noticed some soft tissue swelling as well as ecchymosis over the anterior medial aspect of his right arm which is improving.  He is right-hand dominant.     Social History     Occupational History   • Not on file   Tobacco Use   • Smoking status: Never Smoker   • Smokeless tobacco: Never Used   Substance and Sexual Activity   • Alcohol use: Yes     Comment: SOCIALLY   • Drug use: No   • Sexual activity: Defer      Past Medical History:   Diagnosis Date   • Back pain     LOWER   • High cholesterol    • Right knee pain     SCHEDULED FOR SX     Past Surgical History:   Procedure Laterality Date   • KNEE ACL RECONSTRUCTION Right 4/10/2017    Procedure: KNEE ANTERIOR CRUCIATE LIGAMENT RECONSTRUCTION WITH ALLOGRAFT MEDIDAL FEMORAL CONDYLE CHRONDROPLASTY;  Surgeon: Rudy Gastelum MD;  Location: [facility];  Service:    • VASECTOMY         No family history on file.      Review of Systems   Constitutional: Negative for chills, diaphoresis, fever and unexpected weight change.   HENT: Negative for hearing loss, nosebleeds, sore throat and  "tinnitus.    Eyes: Negative for pain and visual disturbance.   Respiratory: Negative for cough, shortness of breath and wheezing.    Cardiovascular: Negative for chest pain and palpitations.   Gastrointestinal: Negative for abdominal pain, diarrhea, nausea and vomiting.   Endocrine: Negative for cold intolerance, heat intolerance and polydipsia.   Genitourinary: Negative for difficulty urinating, dysuria and hematuria.   Musculoskeletal: Positive for arthralgias and myalgias. Negative for joint swelling.   Skin: Negative for rash and wound.   Allergic/Immunologic: Negative for environmental allergies.   Neurological: Negative for dizziness, syncope and numbness.   Hematological: Does not bruise/bleed easily.   Psychiatric/Behavioral: Negative for dysphoric mood and sleep disturbance. The patient is not nervous/anxious.            Objective:  Vitals:    09/09/19 0947   BP: 128/86   BP Location: Left arm   Patient Position: Sitting   Cuff Size: Large Adult   Pulse: 69   Weight: 104 kg (230 lb)   Height: 172.7 cm (68\")         09/09/19 0947   Weight: 104 kg (230 lb)     Body mass index is 34.97 kg/m².  Physical Exam    Vital signs reviewed.   General: No acute distress, alert and oriented  Eyes: conjunctiva clear; pupils equally round and reactive  ENT: external ears and nose atraumatic; oropharynx clear  CV: no peripheral edema  Resp: normal respiratory effort  Skin: no rashes or wounds; normal turgor  Psych: mood and affect appropriate; recent and remote memory intact          Ortho Exam     Right elbow-maximal tenderness palpation over the antecubital fossa region with positive hook test and increased pain on resisted elbow flexion as well as supination.  4+ out of 5 strength on elbow flexion and extension with range of motion from 0 to 145 degrees.  Stable to varus and valgus stress 0 and 30 degrees.  Supination pronation of 90 degrees each direction with 4- out of 5 strength on resisted supination.  Flexion " extension all digits right hand and MCP and IP joints with 4+ out of 5 strength.  Brisk cap refill all digits, 2+ radial pulse right wrist.  Positive sensation light touch all distributions right hand symmetric to the left, positive sensation ulnar and radial aspects of right forearm.    Imaging:  Right Elbow X-Ray  Indication: Pain  Views: AP and Lateral views    Findings:  No fracture  No bony lesion  Normal soft tissues  Normal joint spaces    No prior studies were available for comparison.      Assessment:        1. Pain    2. Biceps strain, right, initial encounter           Plan:          1. Discussed treatment options at length with patient at today's visit.  Reviewed with patient that I am concerned about biceps tendon rupture distally given his mechanism of injury, localization of pain, and findings from his physical exam.  We discussed options he was to proceed with advanced imaging, I will follow-up with him to review results and discuss further treatment options at that time.  Recommended ice, rest, gentle motion activities until follow-up visit.      Ruperto Michelle was in agreement with plan and had all questions answered.     Orders:  Orders Placed This Encounter   Procedures   • XR Elbow 3+ View Right   • MRI elbow right wo contrast       Medications:  No orders of the defined types were placed in this encounter.      Followup:  Return for review of MRI results.    Ruperto was seen today for pain, edema and numbness.    Diagnoses and all orders for this visit:    Pain  -     XR Elbow 3+ View Right    Biceps strain, right, initial encounter  -     MRI elbow right wo contrast; Future  -     MRI elbow right wo contrast          Dictated utilizing Dragon dictation

## 2019-09-10 ENCOUNTER — TELEPHONE (OUTPATIENT)
Dept: ORTHOPEDIC SURGERY | Facility: CLINIC | Age: 49
End: 2019-09-10

## 2019-09-10 NOTE — TELEPHONE ENCOUNTER
Patient's wife called and ask about MRI that you had spoke to Ruperto about at his appointment 09/09/19  He would like to go to [facility].  Please put in order if you feel it is necessary.  Thanks

## 2019-09-11 ENCOUNTER — TELEPHONE (OUTPATIENT)
Dept: ORTHOPEDIC SURGERY | Facility: CLINIC | Age: 49
End: 2019-09-11

## 2019-09-17 ENCOUNTER — OFFICE VISIT (OUTPATIENT)
Dept: ORTHOPEDIC SURGERY | Facility: CLINIC | Age: 49
End: 2019-09-17

## 2019-09-17 DIAGNOSIS — S46.211A RUPTURE OF RIGHT DISTAL BICEPS TENDON, INITIAL ENCOUNTER: Primary | ICD-10-CM

## 2019-09-17 PROCEDURE — 99214 OFFICE O/P EST MOD 30 MIN: CPT | Performed by: ORTHOPAEDIC SURGERY

## 2019-09-17 NOTE — PROGRESS NOTES
Subjective:     Patient ID: Ruperto Michelle is a 48 y.o. male.    Chief Complaint: Follow-up right elbow pain    History of Present Illness  Ruperto Michelle returns to clinic today for evaluation of his right elbow. He returns today stating his pain has significantly improved. He developed some intermittent swelling at the anterior aspect of his right elbow which was improved with applying ice.   At rest he rates his pain as a 0/10, with rotational motions he rates his pain as a 2-3/10 in severity. This pain is localized to the anterior aspect of his right elbow. He also states his range of motion has improved. His pain is exacerbated with rotational motions. His pain is improved with rest and activity modification.   He denies any radiation of his pain or any associated numbness or tingling.      Social History     Occupational History   • Not on file   Tobacco Use   • Smoking status: Never Smoker   • Smokeless tobacco: Never Used   Substance and Sexual Activity   • Alcohol use: Yes     Comment: SOCIALLY   • Drug use: No   • Sexual activity: Defer      Past Medical History:   Diagnosis Date   • Back pain     LOWER   • High cholesterol    • Right knee pain     SCHEDULED FOR SX     Past Surgical History:   Procedure Laterality Date   • KNEE ACL RECONSTRUCTION Right 4/10/2017    Procedure: KNEE ANTERIOR CRUCIATE LIGAMENT RECONSTRUCTION WITH ALLOGRAFT MEDIDAL FEMORAL CONDYLE CHRONDROPLASTY;  Surgeon: Rudy Gastelum MD;  Location: [facility];  Service:    • VASECTOMY         No family history on file.      Review of Systems   Constitutional: Negative for chills, diaphoresis, fever and unexpected weight change.   HENT: Negative for hearing loss, nosebleeds, sore throat and tinnitus.    Eyes: Negative for pain and visual disturbance.   Respiratory: Negative for cough, shortness of breath and wheezing.    Cardiovascular: Negative for chest pain and palpitations.   Gastrointestinal: Negative for abdominal pain, diarrhea,  nausea and vomiting.   Endocrine: Negative for cold intolerance, heat intolerance and polydipsia.   Genitourinary: Negative for difficulty urinating, dysuria and hematuria.   Musculoskeletal: Negative for arthralgias, joint swelling and myalgias.   Skin: Negative for rash and wound.   Allergic/Immunologic: Negative for environmental allergies.   Neurological: Negative for dizziness, syncope and numbness.   Hematological: Does not bruise/bleed easily.   Psychiatric/Behavioral: Negative for dysphoric mood and sleep disturbance. The patient is not nervous/anxious.            Objective:  There were no vitals filed for this visit.  There were no vitals filed for this visit.  There is no height or weight on file to calculate BMI.    Physical Exam    Vital signs reviewed.   General: No acute distress, alert and oriented  Eyes: conjunctiva clear; pupils equally round and reactive  ENT: external ears and nose atraumatic; oropharynx clear  CV: no peripheral edema  Resp: normal respiratory effort  Skin: no rashes or wounds; normal turgor  Psych: mood and affect appropriate; recent and remote memory intact      Ortho Exam       Right elbow-maximal tenderness palpation over the antecubital fossa region with positive hook test, 4+ out of 5 strength on elbow flexion and extension with range of motion from 0 to 145 degrees.  Stable to varus and valgus stress 0 and 30 degrees.  Supination pronation of 90 degrees each direction with 4- out of 5 strength on resisted supination with increased pain.  Flexion extension all digits right hand and MCP and IP joints with 4+ out of 5 strength.  Brisk cap refill all digits, 2+ radial pulse right wrist.  Positive sensation light touch all distributions right hand symmetric to the left, positive sensation ulnar and radial aspects of right forearm.    Imaging:  Reviewed outside MRI right elbow from [facility] an open MRI including review of images as well as radiology report indicates complete  rupture of distal biceps tendon with retraction of approximately 4.5 cm noted, tendinopathy of the distal biceps tendon is noted as well.  No evidence of fracture fragments or disruption of collateral ligaments of the elbow.    Assessment:        1. Rupture of right distal biceps tendon, initial encounter           Plan:          1. Discussed treatment options at length with patient at today's visit.   2. Patient would like to proceed with surgical treatment at this time. Discussed postoperative surgical protocol with patient today and they are in agreement with the plan and understand postoperative restrictions.   3. Patient denies any past medical history of blood clots or cardiac issues.   4. Prior to surgery date he should limit lifting, pushing and pulling to 10 pounds as tolerated.  5.   Discussed treatment options for patient- wishes to proceed with distal biceps repair. We reviewed risks, benefits, and alternatives of surgery with risks including but not limited to neurovascular damage, bleeding, infection, re-rupture of tendon, failure of healing of tendon, elbow contracture, loss of motion, weakness, stiffness, DVT pulmonary embolus, death, stroke, myocardial infarction, complex regional pain syndrome, and need for additional procedures. Patient understood all these, had all questions answered, and verbally consented to proceeding with surgery. We also discussed typical postop rehab course and that it may take 6-12 months before obtaining maximal improvement. No guarantees were given in regards to the procedure.       Ruperto Michelle was in agreement with plan and had all questions answered.     Orders:  Orders Placed This Encounter   Procedures   • SCANNED - IMAGING       Medications:  No orders of the defined types were placed in this encounter.      Followup:  No Follow-up on file.    Ruperto was seen today for follow-up.    Diagnoses and all orders for this visit:    Rupture of right distal biceps  tendon, initial encounter    Other orders  -     SCANNED - IMAGING      By signing my name here, I Ama Carlson, attest that all documentation on 09/17/19 at 11:45 AM has been prepared under the direction and in the presence of Dr. Rudy Gastelum.    I, Dr. Rudy Gastelum, personally performed the services described in this documentation, as scribed by Ama Carlson, in my presence, and it is both accurate and complete.    Dictated utilizing Dragon dictation

## 2019-09-18 ENCOUNTER — TELEPHONE (OUTPATIENT)
Dept: ORTHOPEDIC SURGERY | Facility: CLINIC | Age: 49
End: 2019-09-18

## 2019-09-19 RX ORDER — PREGABALIN 75 MG/1
75 CAPSULE ORAL ONCE
Status: CANCELLED | OUTPATIENT
Start: 2019-09-19 | End: 2019-09-19

## 2019-09-19 RX ORDER — ACETAMINOPHEN 325 MG/1
1000 TABLET ORAL ONCE
Status: CANCELLED | OUTPATIENT
Start: 2019-09-19 | End: 2019-09-19

## 2019-09-19 RX ORDER — MELOXICAM 7.5 MG/1
15 TABLET ORAL ONCE
Status: CANCELLED | OUTPATIENT
Start: 2019-09-19 | End: 2019-09-19

## 2019-09-20 ENCOUNTER — APPOINTMENT (OUTPATIENT)
Dept: PREADMISSION TESTING | Facility: HOSPITAL | Age: 49
End: 2019-09-20

## 2019-09-20 VITALS
HEIGHT: 68 IN | HEART RATE: 68 BPM | DIASTOLIC BLOOD PRESSURE: 83 MMHG | RESPIRATION RATE: 20 BRPM | BODY MASS INDEX: 35.61 KG/M2 | SYSTOLIC BLOOD PRESSURE: 129 MMHG | WEIGHT: 235 LBS | OXYGEN SATURATION: 99 %

## 2019-09-20 DIAGNOSIS — S46.211A RUPTURE OF RIGHT DISTAL BICEPS TENDON, INITIAL ENCOUNTER: ICD-10-CM

## 2019-09-20 LAB
ABO GROUP BLD: NORMAL
ABO GROUP BLD: NORMAL
APTT PPP: 29.4 SECONDS (ref 24.3–38.1)
BASOPHILS # BLD AUTO: 0.04 10*3/MM3 (ref 0–0.2)
BASOPHILS NFR BLD AUTO: 0.9 % (ref 0–1.5)
BLD GP AB SCN SERPL QL: NEGATIVE
DEPRECATED RDW RBC AUTO: 40.3 FL (ref 37–54)
EOSINOPHIL # BLD AUTO: 0.1 10*3/MM3 (ref 0–0.4)
EOSINOPHIL NFR BLD AUTO: 2.3 % (ref 0.3–6.2)
ERYTHROCYTE [DISTWIDTH] IN BLOOD BY AUTOMATED COUNT: 12.8 % (ref 12.3–15.4)
HCT VFR BLD AUTO: 46.2 % (ref 37.5–51)
HGB BLD-MCNC: 15.5 G/DL (ref 13–17.7)
IMM GRANULOCYTES # BLD AUTO: 0.01 10*3/MM3 (ref 0–0.05)
IMM GRANULOCYTES NFR BLD AUTO: 0.2 % (ref 0–0.5)
INR PPP: 0.97 (ref 0.9–1.1)
LYMPHOCYTES # BLD AUTO: 1.34 10*3/MM3 (ref 0.7–3.1)
LYMPHOCYTES NFR BLD AUTO: 31.2 % (ref 19.6–45.3)
MCH RBC QN AUTO: 29.2 PG (ref 26.6–33)
MCHC RBC AUTO-ENTMCNC: 33.5 G/DL (ref 31.5–35.7)
MCV RBC AUTO: 87 FL (ref 79–97)
MONOCYTES # BLD AUTO: 0.29 10*3/MM3 (ref 0.1–0.9)
MONOCYTES NFR BLD AUTO: 6.8 % (ref 5–12)
NEUTROPHILS # BLD AUTO: 2.51 10*3/MM3 (ref 1.7–7)
NEUTROPHILS NFR BLD AUTO: 58.6 % (ref 42.7–76)
NRBC BLD AUTO-RTO: 0 /100 WBC (ref 0–0.2)
PLATELET # BLD AUTO: 264 10*3/MM3 (ref 140–450)
PMV BLD AUTO: 10.5 FL (ref 6–12)
PROTHROMBIN TIME: 12.6 SECONDS (ref 12.1–15)
RBC # BLD AUTO: 5.31 10*6/MM3 (ref 4.14–5.8)
RH BLD: POSITIVE
RH BLD: POSITIVE
T&S EXPIRATION DATE: NORMAL
WBC NRBC COR # BLD: 4.29 10*3/MM3 (ref 3.4–10.8)

## 2019-09-20 PROCEDURE — 85730 THROMBOPLASTIN TIME PARTIAL: CPT | Performed by: ORTHOPAEDIC SURGERY

## 2019-09-20 PROCEDURE — 86901 BLOOD TYPING SEROLOGIC RH(D): CPT | Performed by: ORTHOPAEDIC SURGERY

## 2019-09-20 PROCEDURE — 36415 COLL VENOUS BLD VENIPUNCTURE: CPT

## 2019-09-20 PROCEDURE — 85025 COMPLETE CBC W/AUTO DIFF WBC: CPT | Performed by: ORTHOPAEDIC SURGERY

## 2019-09-20 PROCEDURE — 86900 BLOOD TYPING SEROLOGIC ABO: CPT

## 2019-09-20 PROCEDURE — 86850 RBC ANTIBODY SCREEN: CPT | Performed by: ORTHOPAEDIC SURGERY

## 2019-09-20 PROCEDURE — 86900 BLOOD TYPING SEROLOGIC ABO: CPT | Performed by: ORTHOPAEDIC SURGERY

## 2019-09-20 PROCEDURE — 85610 PROTHROMBIN TIME: CPT | Performed by: ORTHOPAEDIC SURGERY

## 2019-09-20 PROCEDURE — 86901 BLOOD TYPING SEROLOGIC RH(D): CPT

## 2019-09-20 NOTE — DISCHARGE INSTRUCTIONS
PRE-ADMISSION TESTING INSTRUCTIONS FOR ADULTS    Take these medications the morning of surgery with a small sip of water: Atrovastatin      No aspirin, advil, aleve, ibuprofen, naproxen, diet pills, decongestants, or herbal/vitamins for a week prior to surgery.    General Instructions:    • Do not eat solid food after midnight the night before surgery.  No gum, mints, or hard candy after midnight the night before surgery.  • You may drink clear liquids the day of surgery up until 2 hours before your arrival time.  • Clear liquids are liquids you can see through. Nothing RED in color.    Plain water    Sports drinks  Sodas     Gelatin (Jell-O)  Fruit juices without pulp such as white grape juice and apple juice  Popsicles that contain no fruit or yogurt  Tea or coffee (no cream or milk added)    • It is beneficial for you to have a clear drink that contains carbohydrates just before you leave your house and before your fasting time begins.  We suggest a 20 ounce bottle of Gatorade or Powerade for non-diabetic patients or a 20 ounce bottle of G2 or Powerade Zero for diabetic patients.     • Patients who avoid smoking, chewing tobacco and alcohol for 4 weeks prior to surgery have a reduced risk of post-operative complications.  If at all possible, quit smoking as many days before surgery as you can.    • Do not smoke, use chewing tobacco or drink alcohol the day of surgery    • Bring your C-PAP/ BI-PAP machine if you use one.  • Wear clean comfortable clothes and socks.  • Do not wear contact lenses, lotion, deodorant, or make-up.  Bring a case for your glasses if applicable. You may brush your teeth the morning of surgery.  • You may wear dentures/partials, do not put adhesive/glue on them.  • Bring crutches or walker if applicable.  • Leave all other jewelry and valuables at home.      Preventing a Surgical Site Infection:    • Shower the night before and on the morning of surgery using the chlorhexidine soap you  were given.  Use a clean washcloth with the soap.  Place clean sheets on your bed after showering the night before surgery. Do not use the CHG soap on your hair, face, or private areas. Wash your body gently for five (5) minutes. Do not scrub your skin.  Dry with a clean towel and dress in clean clothing.    • Do not shave the surgical area for 10 days-2 weeks prior to surgery  because the razor can irritate skin and make it easier to develop an infection.  • Make sure you, your family, and all healthcare providers clean their hands with soap and water or an alcohol based hand  before caring for you or your wound.      Day of surgery:    Your surgeon’s office will advise you of your arrival time for the day of surgery.    Upon arrival, a Pre-op nurse and Anesthesia provider will review your health history, obtain vital signs, and answer questions you may have.  The only belongings needed at this time will be your home medications and if applicable your C-PAP/BI-PAP machine.  If you are staying overnight your family can leave the rest of your belongings in the car and bring them to your room later.  A Pre-op nurse will start an IV and you may receive medication in preparation for surgery, including something to help you relax.  Your family will be able to see you in the Pre-op area.  While you are in surgery your family should notify the waiting room  if they leave the waiting room area and provide a contact phone number.    IF you have any questions, you can call the Pre-Admission Department at [phone] or your surgeon's office.  Notify your surgeon if  you become sick, have a fever, productive cough, or cannot be here the day of surgery    Please be aware that surgery does come with discomfort.  We want to make every effort to control your discomfort so please discuss any uncontrolled symptoms with your nurse.   Your doctor will most likely have prescribed pain medications.      If you  are going home after surgery, you will receive individualized written care instructions before being discharged.  A responsible adult (over the age of 18) must drive you to and from the hospital on the day of your surgery and stay with you for 24 hours after anesthesia.    If you are staying overnight following surgery, you will be transported to your hospital room following the recovery period.  [facility] has all private rooms.    You may receive a survey regarding the care you received. Your feedback is very important and will be used to collect the necessary data to help us to continue to provide excellent care.     Deductibles and co-payments are collected on the day of service. Please be prepared to pay the required co-pay, deductible or deposit on the day of service as defined by your plan.

## 2019-09-30 ENCOUNTER — ANESTHESIA EVENT (OUTPATIENT)
Dept: PERIOP | Facility: HOSPITAL | Age: 49
End: 2019-09-30

## 2019-10-02 ENCOUNTER — APPOINTMENT (OUTPATIENT)
Dept: GENERAL RADIOLOGY | Facility: HOSPITAL | Age: 49
End: 2019-10-02

## 2019-10-02 ENCOUNTER — ANESTHESIA (OUTPATIENT)
Dept: PERIOP | Facility: HOSPITAL | Age: 49
End: 2019-10-02

## 2019-10-02 ENCOUNTER — HOSPITAL ENCOUNTER (OUTPATIENT)
Facility: HOSPITAL | Age: 49
Setting detail: HOSPITAL OUTPATIENT SURGERY
Discharge: HOME | End: 2019-10-02
Attending: ORTHOPAEDIC SURGERY | Admitting: ORTHOPAEDIC SURGERY

## 2019-10-02 VITALS
HEART RATE: 86 BPM | RESPIRATION RATE: 16 BRPM | OXYGEN SATURATION: 95 % | DIASTOLIC BLOOD PRESSURE: 89 MMHG | BODY MASS INDEX: 35.31 KG/M2 | HEIGHT: 68 IN | SYSTOLIC BLOOD PRESSURE: 139 MMHG | TEMPERATURE: 97.2 F | WEIGHT: 233 LBS

## 2019-10-02 DIAGNOSIS — S46.211A RUPTURE OF RIGHT DISTAL BICEPS TENDON, INITIAL ENCOUNTER: ICD-10-CM

## 2019-10-02 PROCEDURE — 25010000002 DEXAMETHASONE PER 1 MG: Performed by: ANESTHESIOLOGY

## 2019-10-02 PROCEDURE — 25010000002 FENTANYL CITRATE (PF) 100 MCG/2ML SOLUTION: Performed by: ANESTHESIOLOGY

## 2019-10-02 PROCEDURE — 25010000002 PROPOFOL 10 MG/ML EMULSION: Performed by: NURSE ANESTHETIST, CERTIFIED REGISTERED

## 2019-10-02 PROCEDURE — 25010000002 HYDROMORPHONE 1 MG/ML SOLUTION: Performed by: ANESTHESIOLOGY

## 2019-10-02 PROCEDURE — 76000 FLUOROSCOPY <1 HR PHYS/QHP: CPT

## 2019-10-02 PROCEDURE — 73070 X-RAY EXAM OF ELBOW: CPT

## 2019-10-02 PROCEDURE — C1713 ANCHOR/SCREW BN/BN,TIS/BN: HCPCS | Performed by: ORTHOPAEDIC SURGERY

## 2019-10-02 PROCEDURE — 25010000003 CEFAZOLIN SODIUM-DEXTROSE 2-3 GM-%(50ML) RECONSTITUTED SOLUTION: Performed by: ORTHOPAEDIC SURGERY

## 2019-10-02 PROCEDURE — 25010000002 ONDANSETRON PER 1 MG: Performed by: ANESTHESIOLOGY

## 2019-10-02 PROCEDURE — 24342 REPAIR OF RUPTURED TENDON: CPT | Performed by: ORTHOPAEDIC SURGERY

## 2019-10-02 PROCEDURE — 25010000002 MIDAZOLAM PER 1 MG: Performed by: ANESTHESIOLOGY

## 2019-10-02 PROCEDURE — 25010000002 ONDANSETRON PER 1 MG: Performed by: NURSE ANESTHETIST, CERTIFIED REGISTERED

## 2019-10-02 PROCEDURE — 25010000002 ROPIVACAINE PER 1 MG: Performed by: ANESTHESIOLOGY

## 2019-10-02 DEVICE — KT BUTN REPR SHLDR DIST BIOCOMP: Type: IMPLANTABLE DEVICE | Site: ARM | Status: FUNCTIONAL

## 2019-10-02 DEVICE — SUT TIGERLOOP 2 STR 20IN TW 7MMLP AR7234T: Type: IMPLANTABLE DEVICE | Site: ARM | Status: FUNCTIONAL

## 2019-10-02 RX ORDER — LIDOCAINE HYDROCHLORIDE 20 MG/ML
INJECTION, SOLUTION INFILTRATION; PERINEURAL AS NEEDED
Status: DISCONTINUED | OUTPATIENT
Start: 2019-10-02 | End: 2019-10-02 | Stop reason: SURG

## 2019-10-02 RX ORDER — PROPOFOL 10 MG/ML
VIAL (ML) INTRAVENOUS CONTINUOUS PRN
Status: DISCONTINUED | OUTPATIENT
Start: 2019-10-02 | End: 2019-10-02 | Stop reason: SURG

## 2019-10-02 RX ORDER — SODIUM CHLORIDE, SODIUM LACTATE, POTASSIUM CHLORIDE, CALCIUM CHLORIDE 600; 310; 30; 20 MG/100ML; MG/100ML; MG/100ML; MG/100ML
100 INJECTION, SOLUTION INTRAVENOUS CONTINUOUS
Status: DISCONTINUED | OUTPATIENT
Start: 2019-10-02 | End: 2019-10-02 | Stop reason: HOSPADM

## 2019-10-02 RX ORDER — PREGABALIN 75 MG/1
75 CAPSULE ORAL ONCE
Status: COMPLETED | OUTPATIENT
Start: 2019-10-02 | End: 2019-10-02

## 2019-10-02 RX ORDER — OXYCODONE AND ACETAMINOPHEN 7.5; 325 MG/1; MG/1
1 TABLET ORAL EVERY 4 HOURS PRN
Status: COMPLETED | OUTPATIENT
Start: 2019-10-02 | End: 2019-10-02

## 2019-10-02 RX ORDER — ACETAMINOPHEN 500 MG
1000 TABLET ORAL ONCE
Status: COMPLETED | OUTPATIENT
Start: 2019-10-02 | End: 2019-10-02

## 2019-10-02 RX ORDER — SCOLOPAMINE TRANSDERMAL SYSTEM 1 MG/1
1 PATCH, EXTENDED RELEASE TRANSDERMAL CONTINUOUS
Status: DISCONTINUED | OUTPATIENT
Start: 2019-10-02 | End: 2019-10-02 | Stop reason: HOSPADM

## 2019-10-02 RX ORDER — MIDAZOLAM HYDROCHLORIDE 1 MG/ML
2 INJECTION INTRAMUSCULAR; INTRAVENOUS
Status: DISCONTINUED | OUTPATIENT
Start: 2019-10-02 | End: 2019-10-02 | Stop reason: HOSPADM

## 2019-10-02 RX ORDER — CEFAZOLIN SODIUM 2 G/50ML
2 SOLUTION INTRAVENOUS ONCE
Status: COMPLETED | OUTPATIENT
Start: 2019-10-02 | End: 2019-10-02

## 2019-10-02 RX ORDER — MIDAZOLAM HYDROCHLORIDE 1 MG/ML
1 INJECTION INTRAMUSCULAR; INTRAVENOUS
Status: DISCONTINUED | OUTPATIENT
Start: 2019-10-02 | End: 2019-10-02 | Stop reason: HOSPADM

## 2019-10-02 RX ORDER — LIDOCAINE HYDROCHLORIDE 10 MG/ML
0.5 INJECTION, SOLUTION EPIDURAL; INFILTRATION; INTRACAUDAL; PERINEURAL ONCE AS NEEDED
Status: COMPLETED | OUTPATIENT
Start: 2019-10-02 | End: 2019-10-02

## 2019-10-02 RX ORDER — ROPIVACAINE HYDROCHLORIDE 5 MG/ML
INJECTION, SOLUTION EPIDURAL; INFILTRATION; PERINEURAL
Status: COMPLETED | OUTPATIENT
Start: 2019-10-02 | End: 2019-10-02

## 2019-10-02 RX ORDER — SODIUM CHLORIDE, SODIUM LACTATE, POTASSIUM CHLORIDE, CALCIUM CHLORIDE 600; 310; 30; 20 MG/100ML; MG/100ML; MG/100ML; MG/100ML
9 INJECTION, SOLUTION INTRAVENOUS CONTINUOUS
Status: DISCONTINUED | OUTPATIENT
Start: 2019-10-02 | End: 2019-10-02 | Stop reason: HOSPADM

## 2019-10-02 RX ORDER — DEXAMETHASONE SODIUM PHOSPHATE 4 MG/ML
8 INJECTION, SOLUTION INTRA-ARTICULAR; INTRALESIONAL; INTRAMUSCULAR; INTRAVENOUS; SOFT TISSUE ONCE
Status: COMPLETED | OUTPATIENT
Start: 2019-10-02 | End: 2019-10-02

## 2019-10-02 RX ORDER — SODIUM CHLORIDE 0.9 % (FLUSH) 0.9 %
1-10 SYRINGE (ML) INJECTION AS NEEDED
Status: DISCONTINUED | OUTPATIENT
Start: 2019-10-02 | End: 2019-10-02 | Stop reason: HOSPADM

## 2019-10-02 RX ORDER — MAGNESIUM HYDROXIDE 1200 MG/15ML
LIQUID ORAL AS NEEDED
Status: DISCONTINUED | OUTPATIENT
Start: 2019-10-02 | End: 2019-10-02 | Stop reason: HOSPADM

## 2019-10-02 RX ORDER — SODIUM CHLORIDE 0.9 % (FLUSH) 0.9 %
3 SYRINGE (ML) INJECTION EVERY 12 HOURS SCHEDULED
Status: DISCONTINUED | OUTPATIENT
Start: 2019-10-02 | End: 2019-10-02 | Stop reason: HOSPADM

## 2019-10-02 RX ORDER — MELOXICAM 7.5 MG/1
15 TABLET ORAL ONCE
Status: COMPLETED | OUTPATIENT
Start: 2019-10-02 | End: 2019-10-02

## 2019-10-02 RX ORDER — SENNOSIDES 8.6 MG
1 TABLET ORAL NIGHTLY
Qty: 20 TABLET | Refills: 0 | Status: SHIPPED | OUTPATIENT
Start: 2019-10-02 | End: 2019-10-10

## 2019-10-02 RX ORDER — ONDANSETRON 2 MG/ML
4 INJECTION INTRAMUSCULAR; INTRAVENOUS ONCE AS NEEDED
Status: COMPLETED | OUTPATIENT
Start: 2019-10-02 | End: 2019-10-02

## 2019-10-02 RX ORDER — ACETAMINOPHEN 650 MG
TABLET, EXTENDED RELEASE ORAL AS NEEDED
Status: DISCONTINUED | OUTPATIENT
Start: 2019-10-02 | End: 2019-10-02 | Stop reason: HOSPADM

## 2019-10-02 RX ORDER — PROPOFOL 10 MG/ML
VIAL (ML) INTRAVENOUS AS NEEDED
Status: DISCONTINUED | OUTPATIENT
Start: 2019-10-02 | End: 2019-10-02 | Stop reason: SURG

## 2019-10-02 RX ORDER — FAMOTIDINE 20 MG/1
20 TABLET, FILM COATED ORAL
Status: COMPLETED | OUTPATIENT
Start: 2019-10-02 | End: 2019-10-02

## 2019-10-02 RX ORDER — SODIUM CHLORIDE 9 MG/ML
40 INJECTION, SOLUTION INTRAVENOUS AS NEEDED
Status: DISCONTINUED | OUTPATIENT
Start: 2019-10-02 | End: 2019-10-02 | Stop reason: HOSPADM

## 2019-10-02 RX ORDER — OXYCODONE HYDROCHLORIDE AND ACETAMINOPHEN 5; 325 MG/1; MG/1
1 TABLET ORAL EVERY 4 HOURS PRN
Qty: 42 TABLET | Refills: 0 | Status: SHIPPED | OUTPATIENT
Start: 2019-10-02 | End: 2019-10-10

## 2019-10-02 RX ORDER — FENTANYL CITRATE 50 UG/ML
INJECTION, SOLUTION INTRAMUSCULAR; INTRAVENOUS AS NEEDED
Status: DISCONTINUED | OUTPATIENT
Start: 2019-10-02 | End: 2019-10-02 | Stop reason: SURG

## 2019-10-02 RX ORDER — ONDANSETRON 4 MG/1
4 TABLET, FILM COATED ORAL EVERY 8 HOURS PRN
Qty: 30 TABLET | Refills: 0 | Status: SHIPPED | OUTPATIENT
Start: 2019-10-02 | End: 2019-10-10

## 2019-10-02 RX ADMIN — SODIUM CHLORIDE, POTASSIUM CHLORIDE, SODIUM LACTATE AND CALCIUM CHLORIDE 9 ML/HR: 600; 310; 30; 20 INJECTION, SOLUTION INTRAVENOUS at 09:21

## 2019-10-02 RX ADMIN — PREGABALIN 75 MG: 75 CAPSULE ORAL at 09:13

## 2019-10-02 RX ADMIN — ONDANSETRON 4 MG: 2 INJECTION, SOLUTION INTRAMUSCULAR; INTRAVENOUS at 09:51

## 2019-10-02 RX ADMIN — MIDAZOLAM HYDROCHLORIDE 2 MG: 1 INJECTION, SOLUTION INTRAMUSCULAR; INTRAVENOUS at 10:40

## 2019-10-02 RX ADMIN — CEFAZOLIN SODIUM 2 G: 2 SOLUTION INTRAVENOUS at 10:57

## 2019-10-02 RX ADMIN — OXYCODONE HYDROCHLORIDE AND ACETAMINOPHEN 1 TABLET: 7.5; 325 TABLET ORAL at 13:56

## 2019-10-02 RX ADMIN — ACETAMINOPHEN 1000 MG: 500 TABLET, FILM COATED ORAL at 09:13

## 2019-10-02 RX ADMIN — HYDROMORPHONE HYDROCHLORIDE 1 MG: 1 INJECTION, SOLUTION INTRAMUSCULAR; INTRAVENOUS; SUBCUTANEOUS at 13:44

## 2019-10-02 RX ADMIN — HYDROMORPHONE HYDROCHLORIDE 1 MG: 1 INJECTION, SOLUTION INTRAMUSCULAR; INTRAVENOUS; SUBCUTANEOUS at 13:30

## 2019-10-02 RX ADMIN — MELOXICAM 15 MG: 7.5 TABLET ORAL at 09:13

## 2019-10-02 RX ADMIN — HYDROMORPHONE HYDROCHLORIDE 1 MG: 1 INJECTION, SOLUTION INTRAMUSCULAR; INTRAVENOUS; SUBCUTANEOUS at 14:05

## 2019-10-02 RX ADMIN — ROPIVACAINE HYDROCHLORIDE 20 ML: 5 INJECTION, SOLUTION EPIDURAL; INFILTRATION; PERINEURAL at 10:34

## 2019-10-02 RX ADMIN — FENTANYL CITRATE 25 MCG: 50 INJECTION, SOLUTION INTRAMUSCULAR; INTRAVENOUS at 10:30

## 2019-10-02 RX ADMIN — LIDOCAINE HYDROCHLORIDE 0.5 ML: 10 INJECTION, SOLUTION EPIDURAL; INFILTRATION; INTRACAUDAL; PERINEURAL at 09:13

## 2019-10-02 RX ADMIN — FENTANYL CITRATE 25 MCG: 50 INJECTION, SOLUTION INTRAMUSCULAR; INTRAVENOUS at 10:27

## 2019-10-02 RX ADMIN — PROPOFOL 50 MG: 10 INJECTION, EMULSION INTRAVENOUS at 10:57

## 2019-10-02 RX ADMIN — DEXAMETHASONE SODIUM PHOSPHATE 8 MG: 4 INJECTION, SOLUTION INTRAMUSCULAR; INTRAVENOUS at 09:51

## 2019-10-02 RX ADMIN — ONDANSETRON 4 MG: 2 INJECTION, SOLUTION INTRAMUSCULAR; INTRAVENOUS at 15:24

## 2019-10-02 RX ADMIN — SCOPALAMINE 1 PATCH: 1 PATCH, EXTENDED RELEASE TRANSDERMAL at 09:51

## 2019-10-02 RX ADMIN — LIDOCAINE HYDROCHLORIDE 100 MG: 20 INJECTION, SOLUTION INFILTRATION; PERINEURAL at 10:52

## 2019-10-02 RX ADMIN — FAMOTIDINE 20 MG: 10 INJECTION, SOLUTION INTRAVENOUS at 09:52

## 2019-10-02 RX ADMIN — PROPOFOL 100 MCG/KG/MIN: 10 INJECTION, EMULSION INTRAVENOUS at 10:55

## 2019-10-02 RX ADMIN — HYDROMORPHONE HYDROCHLORIDE 1 MG: 1 INJECTION, SOLUTION INTRAMUSCULAR; INTRAVENOUS; SUBCUTANEOUS at 13:18

## 2019-10-02 NOTE — ANESTHESIA POSTPROCEDURE EVALUATION
Patient: Ruperto Michelle    Procedure Summary     Date:  10/02/19 Room / Location:   [facility] /  [facility]    Anesthesia Start:  1051 Anesthesia Stop:  1303    Procedure:  TENDON DISTAL BICEPS REPAIR/RELEASE (Right Arm Upper) Diagnosis:       Rupture of right distal biceps tendon, initial encounter      (Rupture of right distal biceps tendon, initial encounter [S46.211A])    Surgeon:  Rudy Gastelum MD Provider:  Roxie Malcolm CRNA    Anesthesia Type:  regional, MAC ASA Status:  2          Anesthesia Type: regional, MAC  Last vitals  BP   128/92 (10/02/19 1530)   Temp   97.2 °F (36.2 °C) (10/02/19 1259)   Pulse   78 (10/02/19 1530)   Resp   16 (10/02/19 1530)     SpO2   93 % (10/02/19 1530)     Post Anesthesia Care and Evaluation    Patient location during evaluation: PHASE II  Patient participation: complete - patient participated  Level of consciousness: awake and alert  Pain score: 2  Pain management: satisfactory to patient  Airway patency: patent  Anesthetic complications: No anesthetic complications  PONV Status: none  Cardiovascular status: acceptable  Respiratory status: acceptable  Hydration status: acceptable

## 2019-10-02 NOTE — OP NOTE
Date of Operation:  10/2/2019     PREOPERATIVE DIAGNOSIS: Right distal biceps tendon tear.     POSTOPERATIVE DIAGNOSIS: Right distal biceps tendon tear.      PROCEDURE PERFORMED: Right distal biceps tendon repair.      SURGEON: Rudy Gastelum MD      ASSISTANT: Lukas      ANESTHESIA: General endotracheal anesthesia with regional black.     ESTIMATED BLOOD LOSS: 30 ml.      URINE OUTPUT: Not recorded.      FLUIDS: Per anesthesia.      COMPLICATIONS: None.      SPECIMENS: None.      DRAINS: None.      IMPLANTS: Arthrex distal biceps tendon repair kit.      INDICATIONS: The patient is a pleasant 48 y.o. male with significant history of elbow injury while lifting a heavy object several weeks ago. Subsequent MRI did indicate a distal biceps tendon rupture. I discussed treatment options with the patient at length, he wished to proceed with above-mentioned surgical procedure. I explained details of the procedure as well as risks, benefits, and alternatives as documented on history and physical. He had all questions answered prior to signing the operative consent form and agreed to proceed with surgery at this time. No guarantees were given in regards to results of the procedure.      DETAILS OF PROCEDURE: The patient was seen, evaluated, and cleared for surgery by anesthesia. Patient was met in the preoperative holding area and the operative site was marked, consent was reviewed, history and physical was updated, and preoperative labs were reviewed. A regional block was then placed per anesthesia. The patient was taken to the operating room and placed in a supine position on a regular OR table with the hand table to the operative side. After successful intubation per anesthesia, the right arm was sterilely prepped and draped in a standard fashion. All bony prominences were well padded. The patient was secured to table with a waist strap. A sterile tourniquet was applied to the right upper extremity     Formal timeout was  completed including confirmation of history and physical, operative consent, surgical site, patient identification number, preoperative antibiotic administration. The right arm was then exsanguinated and the tourniquet inflated to 250 mmHg. The procedure was then begun with a transverse incision approximately 4 cm distal to the antecubital fossa crease. Incision was carried through skin only with a #15 blade followed by subcutaneous dissection with Metzenbaum scissors. Blunt dissection was then carried out proximally until the distal biceps tendon stump was identified in the distal arm and retrieved after blunt dissection around significant scar tissue that had formed around the site of the biceps tendon itself. Distal end of the biceps tendon was then whipstitched with an Arthrex loop suture in a standard fashion with a running locking stitch at this point in time. Needle was then cut from the suture strands and traction was pulled allowing for further release of adhesions from both superficial and deep surface of the biceps tendon at this time.      Attention was then turned to distal dissection with superficial veins as well as perforating vessels being coagulated with bipolar electrocautery. Radial tuberosity was identified by blunt palpation with blunt dissection carried out into this direction. Deep retractors were then placed at this point in time with ulnar-sided retraction only and the guide pin was then fired into the radial tuberosity with confirmed position noted under mini C-arm fluoroscopy at this time. Once the tendon was sized as a 7 mm tendon, an 8mm reamer was passed in unicortical fashion over the guide pin creating a bony socket at this time. The wound was thoroughly irrigated removing all bony debris at this point in time in order prevent a synostosis. The biceps button was then assembled onto the strands from the distal end of the biceps tendon and biceps button was placed on its   handle and passed in bicortical fashion through the bony socket on the proximal radius at this time. Button was successfully deployed as confirmed under C-arm fluoroscopy and elbow was brought to approximately 90° of flexion while the sutures were pulled bringing the biceps tendon into the socket and docking it successfully at this time. SwiveLock anchor was then passed over one of the lead sutures and inserted into the socket achieving an interference fit at this point in time while the suture was then tied over the screw at this time. Extraneous strands of the suture were then cut short. Elbow was ranged to approximately 60° short of full extension with moderate tension noted on the biceps tendon and muscle belly at this point in time.      Final fluoroscopic images were taken confirming appropriate position of socket, as well as retained successful deployment of the biceps button. The wound was once again thoroughly irrigated with normal saline at this point in time. Attention was then turned to closure of the wound with 3-0 Vicryl for subcutaneous layer, and 3-0 strata fix with Prineo mesh and glue being used for skin closure.  Wound was then dressed with Telfa, ABD pad, Webril, 4 x 30 posterior splint with arm position in flexion and full supination, and Ace wrap.      At the end of the procedure all sponge counts were correct x2. The patient had brisk capillary refill to all digits of the left upper extremity. Compartments were soft and easily compressible at the end of the procedure.      DISPOSITION: The patient was extubated per anesthesia and taken to the recovery room in stable condition. He will be discharged home in the care of his family. Follow up in one week for wound check, splint removal and transition to hinged elbow brace and we will start on distal biceps tendon repair protocol at that time. Results of the procedure were discussed immediately postoperatively with the patient's family and they  had all questions answered at that time.

## 2019-10-02 NOTE — ANESTHESIA PROCEDURE NOTES
Airway  Urgency: elective    Date/Time: 10/2/2019 11:30 AM  End Time:10/2/2019 11:30 AM  Airway not difficult    General Information and Staff    Patient location during procedure: OR  CRNA: Roxie Malcolm CRNA    Indications and Patient Condition    Preoxygenated: yes  MILS maintained throughout  Mask difficulty assessment: 0 - not attempted    Final Airway Details  Final airway type: supraglottic airway      Successful airway: unique  Size 4    Number of attempts at approach: 1  Assessment: lips, teeth, and gum same as pre-op and atraumatic intubation

## 2019-10-02 NOTE — ANESTHESIA PROCEDURE NOTES
Peripheral Block    Pre-sedation assessment completed: 10/2/2019 10:25 AM    Patient reassessed immediately prior to procedure    Patient location during procedure: pre-op  Start time: 10/2/2019 10:27 AM  Stop time: 10/2/2019 10:35 AM  Reason for block: at surgeon's request and primary anesthetic  Performed by  Anesthesiologist: Sarah Gill MD  Preanesthetic Checklist  Completed: patient identified, site marked, surgical consent, pre-op evaluation, timeout performed, IV checked, risks and benefits discussed and monitors and equipment checked  Prep:  Pt Position: supine  Sterile barriers:cap, gloves and mask  Prep: ChloraPrep  Patient monitoring: blood pressure monitoring, continuous pulse oximetry and EKG  Procedure  Sedation:yes  Performed under: PNB  Guidance:ultrasound guided  ULTRASOUND INTERPRETATION. Using ultrasound guidance a 21 G gauge needle was placed in close proximity to the nerve, at which point, under ultrasound guidance anesthetic was injected in the area of the nerve and spread of the anesthesia was seen on ultrasound in close proximity thereto.  There were no abnormalities seen on ultrasound; a digital image was taken; and the patient tolerated the procedure with no complications. Images:still images obtained, printed/placed on chart    Laterality:right  Block Type:supraclavicular  Injection Technique:single-shot  Needle Type:echogenic      Medications Used: ropivacaine (NAROPIN) injection 0.5 %, 20 mL  Med admintered at 10/2/2019 10:34 AM      Medications  Comment:Skin infiltrated with 1% lidocaine.    Post Assessment  Injection Assessment: negative aspiration for heme, no paresthesia on injection and incremental injection  Patient Tolerance:comfortable throughout block  Complications:no

## 2019-10-02 NOTE — ANESTHESIA PREPROCEDURE EVALUATION
Anesthesia Evaluation     Patient summary reviewed and Nursing notes reviewed   history of anesthetic complications: PONV  NPO Solid Status: > 8 hours  NPO Liquid Status: > 2 hours           Airway   Mallampati: I  TM distance: >3 FB  Neck ROM: full  No difficulty expected  Dental - normal exam     Pulmonary - normal exam    breath sounds clear to auscultation  (-) not a smokerSleep apnea: snores, uses a mouthguard that helps.  Cardiovascular - normal exam    Rhythm: regular  Rate: normal    (+) dysrhythmias (occ extra beat), hyperlipidemia,       Neuro/Psych  (+) numbness (right hand due to injury),     GI/Hepatic/Renal/Endo    (+) obesity,   liver disease fatty liver disease,     Musculoskeletal     (+) back pain (history, none at present),   Abdominal   (+) obese,    Substance History   (-) drug useAlcohol use: 2 drinks 3xper week.     OB/GYN negative ob/gyn ROS         Other        Arthritis: ACL repair right knee 2017, right thumb.                Anesthesia Plan    ASA 2     regional and MAC   (Brachial plexus block with MAC or GA discussed with patient.  Accepts either.)  intravenous induction   Anesthetic plan, all risks, benefits, and alternatives have been provided, discussed and informed consent has been obtained with: patient.  Use of blood products discussed with patient  Consented to blood products.

## 2019-10-02 NOTE — H&P
Orthopedic H&P    Patient ID: Ruperto Michelle is a 48 y.o. male.     Chief Complaint: Right elbow pain, distal biceps tendon rupture     History of Present Illness  Ruperto Michelle  presents for surgical treatment of his right elbow.  He initially injured his elbow while lifting heavy object approximately 4 weeks ago. At rest he rates his pain as a 0/10, with rotational motions he rates his pain as a 2-3/10 in severity. This pain is localized to the anterior aspect of his right elbow. He also states his range of motion has improved. His pain is exacerbated with rotational motions. His pain is improved with rest and activity modification.   He denies any radiation of his pain or any associated numbness or tingling.     No current facility-administered medications on file prior to encounter.      Current Outpatient Medications on File Prior to Encounter   Medication Sig Dispense Refill   • atorvastatin (LIPITOR) 20 MG tablet        No Known Allergies       Social History            Occupational History   • Not on file   Tobacco Use   • Smoking status: Never Smoker   • Smokeless tobacco: Never Used   Substance and Sexual Activity   • Alcohol use: Yes       Comment: SOCIALLY   • Drug use: No   • Sexual activity: Defer      Medical History        Past Medical History:   Diagnosis Date   • Back pain       LOWER   • High cholesterol     • Right knee pain       SCHEDULED FOR SX         Surgical History         Past Surgical History:   Procedure Laterality Date   • KNEE ACL RECONSTRUCTION Right 4/10/2017     Procedure: KNEE ANTERIOR CRUCIATE LIGAMENT RECONSTRUCTION WITH ALLOGRAFT MEDIDAL FEMORAL CONDYLE CHRONDROPLASTY;  Surgeon: Rudy Gastelum MD;  Location: [facility];  Service:    • VASECTOMY                No family history on file.        Review of Systems   Constitutional: Negative for chills, diaphoresis, fever and unexpected weight change.   HENT: Negative for hearing loss, nosebleeds, sore throat and tinnitus.   "  Eyes: Negative for pain and visual disturbance.   Respiratory: Negative for cough, shortness of breath and wheezing.    Cardiovascular: Negative for chest pain and palpitations.   Gastrointestinal: Negative for abdominal pain, diarrhea, nausea and vomiting.   Endocrine: Negative for cold intolerance, heat intolerance and polydipsia.   Genitourinary: Negative for difficulty urinating, dysuria and hematuria.   Musculoskeletal: Negative for arthralgias, joint swelling and myalgias.   Skin: Negative for rash and wound.   Allergic/Immunologic: Negative for environmental allergies.   Neurological: Negative for dizziness, syncope and numbness.   Hematological: Does not bruise/bleed easily.   Psychiatric/Behavioral: Negative for dysphoric mood and sleep disturbance. The patient is not nervous/anxious.       Abilities: None        Objective:  Vitals:    10/02/19 0840   BP: 135/91   BP Location: Left arm   Patient Position: Lying   Pulse: 73   Resp: 14   Temp: 98.4 °F (36.9 °C)   TempSrc: Oral   SpO2: 96%   Weight: 106 kg (233 lb)   Height: 172.7 cm (68\")         Physical Exam     Vital signs reviewed.   General: No acute distress, alert and oriented  Eyes: conjunctiva clear; pupils equally round and reactive  ENT: external ears and nose atraumatic; oropharynx clear  CV: no peripheral edema  Resp: normal respiratory effort  Skin: no rashes or wounds; normal turgor  Psych: mood and affect appropriate; recent and remote memory intact        Objective     Ortho Exam         Right elbow-maximal tenderness palpation over the antecubital fossa region with positive hook test, 4+ out of 5 strength on elbow flexion and extension with range of motion from 0 to 145 degrees.  Stable to varus and valgus stress 0 and 30 degrees.  Supination pronation of 90 degrees each direction with 4- out of 5 strength on resisted supination with increased pain.  Flexion extension all digits right hand and MCP and IP joints with 4+ out of 5 strength. "  Brisk cap refill all digits, 2+ radial pulse right wrist.  Positive sensation light touch all distributions right hand symmetric to the left, positive sensation ulnar and radial aspects of right forearm.     Imaging:  Reviewed outside MRI right elbow from [facility] an open MRI including review of images as well as radiology report indicates complete rupture of distal biceps tendon with retraction of approximately 4.5 cm noted, tendinopathy of the distal biceps tendon is noted as well.  No evidence of fracture fragments or disruption of collateral ligaments of the elbow.     Assessment:        Assessment       1. Rupture of right distal biceps tendon, initial encounter             Plan:        Plan      Discussed treatment options at length with patient at today's visit.   Patient would like to proceed with surgical treatment at this time. Discussed postoperative surgical protocol with patient today and they are in agreement with the plan and understand postoperative restrictions.   1. Patient denies any past medical history of blood clots or cardiac issues.   2. Discussed treatment options for patient- wishes to proceed with distal biceps repair. We reviewed risks, benefits, and alternatives of surgery with risks including but not limited to neurovascular damage, bleeding, infection, re-rupture of tendon, failure of healing of tendon, elbow contracture, loss of motion, weakness, stiffness, DVT pulmonary embolus, death, stroke, myocardial infarction, complex regional pain syndrome, and need for additional procedures. Patient understood all these, had all questions answered, and consented to proceeding with surgery. We also discussed typical postop rehab course and that it may take 6-12 months before obtaining maximal improvement. No guarantees were given in regards to the procedure.       Ruperto Michelle was in agreement with plan and had all questions answered.        Dictated utilizing Dragon dictation

## 2019-10-10 ENCOUNTER — OFFICE VISIT (OUTPATIENT)
Dept: ORTHOPEDIC SURGERY | Facility: CLINIC | Age: 49
End: 2019-10-10

## 2019-10-10 DIAGNOSIS — Z09 STATUS POST ORTHOPEDIC SURGERY, FOLLOW-UP EXAM: Primary | ICD-10-CM

## 2019-10-10 DIAGNOSIS — S46.211A RUPTURE OF RIGHT DISTAL BICEPS TENDON, INITIAL ENCOUNTER: ICD-10-CM

## 2019-10-10 PROCEDURE — 99024 POSTOP FOLLOW-UP VISIT: CPT | Performed by: NURSE PRACTITIONER

## 2019-10-10 RX ORDER — MELOXICAM 7.5 MG/1
7.5 TABLET ORAL DAILY
COMMUNITY

## 2019-10-10 NOTE — PROGRESS NOTES
CC: status post Right distal biceps tendon repair, DOS 10/02/2019     Interval history: Ruperto Michelle presents back to clinic for follow-up right upper extremity.  He is continued with sling and splint as instructed as well as finger range of motion.  Denies concerns in regards to the right upper extremity.  Denies presence of fevers, sweats, chills.  He has been ambulating, negative calf tenderness, negative Homans sign.  Pain well controlled with current medication regimen.  Denies other concerns that he has at this time.     Exam:  Right upper extremity examined out of splint  Positive sensation light touch all distributions of the right upper extremity  Flex extend all digits of the right hand, brisk cap refill all digits  Incision clean dry and intact pernio dressing intact, negative for erythema, drainage  Moderate edema present medial aspect elbow  All compartment soft and easily compressible  2+ distal radial pulse     Assessment: Status post right distal biceps tendon repair     Plan:  1.  Discussed plan of care with patient.  Fitted with elbow brace 45 degrees and allow for full flexion.  We will have him start with physical therapy, distal biceps tendon repair protocol.  We will plan to see him back in clinic with Dr. Gastelum 3 weeks with x-rays out of brace. Discussed no submerging into water for the next 3 weeks.  2. wrapped with Ace bandage.  Encouraged to continue with finger range of motion and hand strengthening strict instructions to avoid extension. Patient verbalized understanding of all information and agrees with plan of care. Denies all other concerns present at this time.

## 2019-10-17 ENCOUNTER — HOSPITAL ENCOUNTER (OUTPATIENT)
Dept: OCCUPATIONAL THERAPY | Facility: HOSPITAL | Age: 49
Setting detail: THERAPIES SERIES
Discharge: HOME | End: 2019-10-17

## 2019-10-17 ENCOUNTER — APPOINTMENT (OUTPATIENT)
Dept: PHYSICAL THERAPY | Facility: HOSPITAL | Age: 49
End: 2019-10-17

## 2019-10-17 ENCOUNTER — TELEPHONE (OUTPATIENT)
Dept: ORTHOPEDIC SURGERY | Facility: CLINIC | Age: 49
End: 2019-10-17

## 2019-10-17 DIAGNOSIS — S46.211A RUPTURE OF RIGHT DISTAL BICEPS TENDON, INITIAL ENCOUNTER: ICD-10-CM

## 2019-10-17 DIAGNOSIS — S46.211D TRAUMATIC PARTIAL TEAR OF RIGHT BICEPS TENDON, SUBSEQUENT ENCOUNTER: Primary | ICD-10-CM

## 2019-10-17 DIAGNOSIS — Z09 STATUS POST ORTHOPEDIC SURGERY, FOLLOW-UP EXAM: Primary | ICD-10-CM

## 2019-10-17 PROCEDURE — 97165 OT EVAL LOW COMPLEX 30 MIN: CPT

## 2019-10-17 NOTE — TELEPHONE ENCOUNTER
Patient is here at [facility] for therapy services, there was an ordered placed for PT- and they need it to be OT. Status post orthopedic surgery-Distal biceps tendon repair, DOS 10/02/2019.    Thanks you.

## 2019-10-17 NOTE — THERAPY EVALUATION
Outpatient Occupational Therapy Ortho Initial Evaluation   Felisa Khan     Patient Name: Ruperto Michelle  : 1970  MRN: [id number]  Today's Date: 10/17/2019      Visit Date: 10/17/2019    Patient Active Problem List   Diagnosis   • Rupture of anterior cruciate ligament of right knee   • Status post repair of anterior cruciate ligament, right   • Patellofemoral stress syndrome of right knee   • Tendinitis of left rotator cuff   • Patella, chondromalacia, right   • Rupture of right distal biceps tendon   • Status post orthopedic surgery, follow-up exam, Distal biceps tendon repair, DOS 10/02/2019        Past Medical History:   Diagnosis Date   • Back pain     LOWER   • Fatty liver    • High cholesterol    • PONV (postoperative nausea and vomiting)    • Right knee pain     SCHEDULED FOR SX        Past Surgical History:   Procedure Laterality Date   • BICEPS TENDON REPAIR Right 10/2/2019    Procedure: TENDON DISTAL BICEPS REPAIR/RELEASE;  Surgeon: Rudy Gastelum MD;  Location:  [facility];  Service: Orthopedics   • KNEE ACL RECONSTRUCTION Right 4/10/2017    Procedure: KNEE ANTERIOR CRUCIATE LIGAMENT RECONSTRUCTION WITH ALLOGRAFT MEDIDAL FEMORAL CONDYLE CHRONDROPLASTY;  Surgeon: Rudy Gastelum MD;  Location:  [facility];  Service:    • VASECTOMY           Visit Dx:    ICD-10-CM ICD-9-CM   1. Traumatic partial tear of right biceps tendon, subsequent encounter S46.211D V58.89     840.8       Patient History     Row Name 10/17/19 1100             History    Chief Complaint  Joint swelling;Joint stiffness;Muscle weakness  -SD      Type of Pain  -- Pt reports no pain  -SD      Date Current Problem(s) Began  10/02/19 surgery  -SD      Brief Description of Current Complaint  Pt reports he injured his right elbow while lifting a heavy box in 2019. Pt consulted with Dr. Gastelum and was found to have distal bicep injury. Pt had distal bicep repair on 10-2-19. Pt is in a hinged elbow brace () Pt referred to  therapy to advance per protocol.  -SD      Patient/Caregiver Goals  Return to prior level of function  -SD      Patient's Rating of General Health  Very good  -SD      Hand Dominance  right-handed  -SD      Occupation/sports/leisure activities  /enjoys cooking and golf  -SD      How has patient tried to help current problem?  elevation for edema, hand motion  -SD         Pain     Pain Location  -- pt reports no pain  -SD      Difficulties with ADL's?  Pt is performing most daily one handed  -SD         Fall Risk Assessment    Any falls in the past year:  No  -SD         Daily Activities    Primary Language  English  -SD      How does patient learn best?  Reading  -SD      Teaching needs identified  Home Exercise Program;Management of Condition  -SD      Patient is concerned about/has problems with  Grasping objects lifting;Performing home management (household chores, shopping, care of dependents);Performing sports, recreation, and play activities  -SD      Does patient have problems with the following?  None  -SD      Barriers to learning  None  -SD      Pt Participated in POC and Goals  Yes  -SD         Safety    Are you being hurt, hit, or frightened by anyone at home or in your life?  No  -SD      Are you being neglected by a caregiver  No  -SD        User Key  (r) = Recorded By, (t) = Taken By, (c) = Cosigned By    Initials Name Provider Type    SD Angelito Eason OTR Occupational Therapist          OT Ortho     Row Name 10/17/19 1200             Right Upper Ext    Rt Elbow Extension/Flexion AROM  -- active extension to 45 without pain  -SD      Rt Elbow Extension/Flexion PROM  -- elbow flexion to 120 no pain  -SD      Rt Elbow Supination PROM  60  -SD      Rt Wrist Flexion AROM  wfl  -SD      Rt Wrist Extension AROM  wfl  -SD         Left Upper Ext    Lt Elbow Extension/Flexion AROM  wnl  -SD      Lt Elbow Supination AROM  75  -SD         RUE Edema - Circumference (cm)    Elbow Crease  32.5 cm  -SD          LUE Edema - Circumference (cm)    Elbow Crease  30.5 cm  -SD        User Key  (r) = Recorded By, (t) = Taken By, (c) = Cosigned By    Initials Name Provider Type    Angelito Chamberlain OTR Occupational Therapist             Hand Therapy (last 24 hours)      Hand Eval     Row Name 10/17/19 1200              Strength Left    # Reps  1  -SD      Left Rung  2  -SD      Left  Test 1  100  -SD       Strength Average Left  100  -SD        User Key  (r) = Recorded By, (t) = Taken By, (c) = Cosigned By    Initials Name Provider Type    Angelito Chamberlain OTR Occupational Therapist              Therapy Education  Education Details: Education with activity/rom restrictions of RUE. Education with edema management, hand strengthening with theraputty and rom program per distal bicep tendon repair protocol  Given: HEP, Symptoms/condition management, Edema management  Program: New  How Provided: Verbal, Demonstration, Written  Provided to: Patient  Level of Understanding: Teach back education performed, Verbalized, Demonstrated    OT Goals     Row Name 10/17/19 1300          OT Short Term Goals    STG Date to Achieve  11/14/19  -SD     STG 1  Pt to be independent with home rom program per protocol to allow for basic adl tasks.  -SD     STG 1 Progress  New  -SD     STG 2  Pt to be independent with right hand strengthening program  -SD     STG 2 Progress  New  -SD     STG 3  Pt to improve right supination by 10 degrees to allow for basic adl tasks.  -SD     STG 3 Progress  New  -SD     STG 3 Progress Comments  i  -SD        Long Term Goals    LTG Date to Achieve  11/28/19  -SD     LTG 1  Pt to achieve FISCHER of right elbow >120 to allow for daily tasks.  -SD     LTG 1 Progress  New  -SD     LTG 2  Strength goals to be established after follow up with physician.  -SD       User Key  (r) = Recorded By, (t) = Taken By, (c) = Cosigned By    Initials Name Provider Type    Angelito Chamberlain OTR Occupational Therapist           OT Assessment/Plan     Row Name 10/17/19 1557          OT Assessment    Functional Limitations  Performance in self-care ADL;Performance in leisure activities  -SD     Impairments  Edema;Range of motion;Muscle strength  -SD     Assessment Comments  Pt with right distal bicep repair on 10-2-19.  Pt wears a hinged elbow brace at  per protocol. Pt reports no pain and is able to demonstrate rom to limit of brace (active elbow extension and passive elbow flexion). Education provided regarding activity/rom restriction per protocol.  Edcuation with edema management strategies and with hand stregthening program using theraputty.  -SD     Please refer to paper survey for additional self-reported information  Yes  -SD     OT Diagnosis  distal bicep repair, decreased rom/strength  -SD     OT Rehab Potential  Good  -SD     Patient/caregiver participated in establishment of treatment plan and goals  Yes  -SD     Patient would benefit from skilled therapy intervention  Yes  -SD        OT Plan    OT Frequency  Other (comment);1x/week Pt is going out of town for a few weeks. Pt to schedule therapy appointment when he returns in early Nov.  -SD     Predicted Duration of Therapy Intervention (Therapy Eval)  8 weeks  -SD     Planned CPT's?  OT EVAL LOW COMPLEXITY: 04158;OT THER ACT EA 15 MIN: [id number];OT HOT/COLD PACK  -SD     Planned Therapy Interventions (Optional Details)  home exercise program;patient/family education;ROM (Range of Motion);strengthening  -SD     OT Plan Comments  Rec therapy to address edema, functional rom and strength of RUE per distal bicep repair protocol  -SD       User Key  (r) = Recorded By, (t) = Taken By, (c) = Cosigned By    Initials Name Provider Type    Angelito Chamberlain OTR Occupational Therapist           OT Exercises     Row Name 10/17/19 1300             Exercise 1    Exercise Name 1  Education with edema management (elevation and hand pumping)  -SD         Exercise 2    Exercise  Name 2  rom program (passive flexion and passive supination)  -SD         Exercise 3    Exercise Name 3  hand strengthening with theraputty  -SD      Equipment 3  Theraputty  -SD      Resistance 3  Green  -SD        User Key  (r) = Recorded By, (t) = Taken By, (c) = Cosigned By    Initials Name Provider Type    SD Angelito Eason, OTR Occupational Therapist                      Time Calculation:    OT Start Time: 1140  OT Stop Time: 1215  OT Time Calculation (min): 35 min     Therapy Charges for Today     Code Description Service Date Service Provider Modifiers Qty    14174432202  OT EVAL LOW COMPLEXITY 2 10/17/2019 Angelito Eason OTJEVON GO 1                  USAMA Hamilton  10/17/2019

## 2019-11-05 ENCOUNTER — OFFICE VISIT (OUTPATIENT)
Dept: ORTHOPEDIC SURGERY | Facility: CLINIC | Age: 49
End: 2019-11-05

## 2019-11-05 VITALS — HEIGHT: 68 IN | WEIGHT: 233 LBS | BODY MASS INDEX: 35.31 KG/M2

## 2019-11-05 DIAGNOSIS — Z09 STATUS POST ORTHOPEDIC SURGERY, FOLLOW-UP EXAM: Primary | ICD-10-CM

## 2019-11-05 PROCEDURE — 99024 POSTOP FOLLOW-UP VISIT: CPT | Performed by: ORTHOPAEDIC SURGERY

## 2019-11-05 PROCEDURE — 73080 X-RAY EXAM OF ELBOW: CPT | Performed by: ORTHOPAEDIC SURGERY

## 2019-11-05 RX ORDER — SIMVASTATIN 20 MG
20 TABLET ORAL
Refills: 0 | COMMUNITY
Start: 2019-10-05

## 2019-12-03 ENCOUNTER — OFFICE VISIT (OUTPATIENT)
Dept: ORTHOPEDIC SURGERY | Facility: CLINIC | Age: 49
End: 2019-12-03

## 2019-12-03 ENCOUNTER — DOCUMENTATION (OUTPATIENT)
Dept: OCCUPATIONAL THERAPY | Facility: HOSPITAL | Age: 49
End: 2019-12-03

## 2019-12-03 VITALS — HEIGHT: 68 IN | WEIGHT: 225 LBS | BODY MASS INDEX: 34.1 KG/M2

## 2019-12-03 DIAGNOSIS — Z09 STATUS POST ORTHOPEDIC SURGERY, FOLLOW-UP EXAM: Primary | ICD-10-CM

## 2019-12-03 PROCEDURE — 99024 POSTOP FOLLOW-UP VISIT: CPT | Performed by: ORTHOPAEDIC SURGERY

## 2019-12-03 NOTE — PROGRESS NOTES
CC: Status post right distal biceps tendon repair. DOI:10/02/19    Interval History: Patient returns to clinic today stating he is doing well. He has not been to physical therapy, he was given some exercises and has been performing them at home. He denies any residual pain at this time.     Exam:  Right Elbow-  Elbow ROM 0-155 degrees  5/5 strength on flexion and extension fingers and wrist  Active supination to 80 with 4/5 strength  Active pronation to 90 with 4+/5 strength    Impression: Right distal biceps tendon repair    Plan:  1. Patient had all questions answered today and was in agreement with treatment plan.  2. Instructed patient to perform biceps curl with light weight and supination exercises.   3. Follow-up in 6 weeks with repeat x-ray of the right elbow.    By signing my name here, I Ama Carlson, attest that all documentation on 12/03/19 at 7:51 AM has been prepared under the direction and in the presence of Dr. Rudy Gastelum.    I, Dr. Rudy Gastelum, personally performed the services described in this documentation, as scribed by Ama Carlson, in my presence, and it is both accurate and complete.

## 2019-12-03 NOTE — THERAPY DISCHARGE NOTE
Outpatient Occupational Therapy Discharge Summary         Patient Name: Ruperto Michelle  : 1970  MRN: [id number]    Today's Date: 12/3/2019      Visit Date: 2019      OT Goals     Row Name 19 1500          OT Short Term Goals    STG Date to Achieve  19  -SD     STG 1  Pt to be independent with home rom program per protocol to allow for basic adl tasks.  -SD     STG 1 Progress  Met met at time of eval  -SD     STG 2  Pt to be independent with right hand strengthening program  -SD     STG 2 Progress  New  -SD     STG 3  Pt to improve right supination by 10 degrees to allow for basic adl tasks.  -SD     STG 3 Progress  New  -SD        Long Term Goals    LTG Date to Achieve  19  -SD     LTG 1  Pt to achieve FISCHER of right elbow >120 to allow for daily tasks.  -SD     LTG 1 Progress  New  -SD     LTG 2  Strength goals to be established after follow up with physician.  -SD       User Key  (r) = Recorded By, (t) = Taken By, (c) = Cosigned By    Initials Name Provider Type    Angelito Chamberlain OTR Occupational Therapist           OP OT Discharge Summary  Date of Discharge: 19  Reason for Discharge: Patient/Caregiver request  Outcomes Achieved: Patient able to partially acheive established goals  Discharge Destination: Home with home program  Discharge Instructions: Pt came in for evaluation only. Pt was provided with HEP at time of evaluation. Pt was able to verbalize his activity restrictions at time of evaluation      Time Calculation:                         USAMA Hamilton   12/3/2019

## 2020-01-14 ENCOUNTER — OFFICE VISIT (OUTPATIENT)
Dept: ORTHOPEDIC SURGERY | Facility: CLINIC | Age: 50
End: 2020-01-14

## 2020-01-14 VITALS — WEIGHT: 225 LBS | BODY MASS INDEX: 34.1 KG/M2 | HEIGHT: 68 IN

## 2020-01-14 DIAGNOSIS — R52 PAIN: Primary | ICD-10-CM

## 2020-01-14 DIAGNOSIS — Z09 STATUS POST ORTHOPEDIC SURGERY, FOLLOW-UP EXAM: ICD-10-CM

## 2020-01-14 PROCEDURE — 73080 X-RAY EXAM OF ELBOW: CPT | Performed by: ORTHOPAEDIC SURGERY

## 2020-01-14 PROCEDURE — 99212 OFFICE O/P EST SF 10 MIN: CPT | Performed by: ORTHOPAEDIC SURGERY

## 2020-01-14 NOTE — PROGRESS NOTES
Subjective:     Patient ID: Ruperto Michelle is a 49 y.o. male.    Chief Complaint:  Follow-up status post right distal biceps tendon repair, 10/2/2019  History of Present Illness  Ruperto Michelle returns to clinic today for evaluation of right elbow doing very well at this point time, notes minimal soreness occasionally at night of the anterior aspect of the elbow with extension.  Rates as a 1 out of 10.  Denies any difficulties or pain with lifting or resistance activities.  Denies associated numbness or tingling and no issues with incision.     Social History     Occupational History   • Not on file   Tobacco Use   • Smoking status: Never Smoker   • Smokeless tobacco: Never Used   Substance and Sexual Activity   • Alcohol use: Yes     Alcohol/week: 6.0 standard drinks     Types: 6 Shots of liquor per week     Comment: SOCIALLY   • Drug use: No   • Sexual activity: Defer      Past Medical History:   Diagnosis Date   • Back pain     LOWER   • Fatty liver    • High cholesterol    • PONV (postoperative nausea and vomiting)    • Right knee pain     SCHEDULED FOR SX     Past Surgical History:   Procedure Laterality Date   • BICEPS TENDON REPAIR Right 10/2/2019    Procedure: TENDON DISTAL BICEPS REPAIR/RELEASE;  Surgeon: Rudy Gastelum MD;  Location: [facility];  Service: Orthopedics   • KNEE ACL RECONSTRUCTION Right 4/10/2017    Procedure: KNEE ANTERIOR CRUCIATE LIGAMENT RECONSTRUCTION WITH ALLOGRAFT MEDIDAL FEMORAL CONDYLE CHRONDROPLASTY;  Surgeon: Rudy Gastelum MD;  Location: [facility] OR;  Service:    • VASECTOMY         No family history on file.      Review of Systems   Constitutional: Negative for chills, diaphoresis, fever and unexpected weight change.   HENT: Negative for hearing loss, nosebleeds, sore throat and tinnitus.    Eyes: Negative for pain and visual disturbance.   Respiratory: Negative for cough, shortness of breath and wheezing.    Cardiovascular: Negative for chest pain and palpitations.  "  Gastrointestinal: Negative for abdominal pain, diarrhea, nausea and vomiting.   Endocrine: Negative for cold intolerance, heat intolerance and polydipsia.   Genitourinary: Negative for difficulty urinating, dysuria and hematuria.   Musculoskeletal: Positive for arthralgias and myalgias. Negative for joint swelling.   Skin: Negative for rash and wound.   Allergic/Immunologic: Negative for environmental allergies.   Neurological: Negative for dizziness, syncope and numbness.   Hematological: Does not bruise/bleed easily.   Psychiatric/Behavioral: Negative for dysphoric mood and sleep disturbance. The patient is not nervous/anxious.            Objective:  Vitals:    01/14/20 0953   Weight: 102 kg (225 lb)   Height: 172.7 cm (68\")         01/14/20  0953   Weight: 102 kg (225 lb)     Body mass index is 34.21 kg/m².  General: No acute distress.  Resp: normal respiratory effort  Skin: no rashes or wounds; normal turgor  Psych: mood and affect appropriate; recent and remote memory intact          Ortho Exam     Right elbow-enter incision well-healed, elbow range of motion 0 to 155 degrees, 5 out of 5 strength on flexion extension, 5 out of 5 strength on supination and pronation to 90 degrees in each direction.  Tendon palpated to be in continuity on resisted supination and elbow flexion with a negative hook test.  Imaging:  Three-view x-rays right elbow AP, lateral, oblique views, ordered and reviewed by me, indication status post distal biceps repair, compared to prior postoperative x-rays from office.  Radial tunnel at tuberosity noted to be stable in position with no evidence of heterotopic ossification or mineralization, hardware for biceps repair is intact and stable in position.  Assessment:        1. Pain    2. Status post orthopedic surgery, follow-up exam, Distal biceps tendon repair, DOS 10/02/2019           Plan:          1. Discussed treatment options at length with patient at today's visit.  He is doing very " well at this point time, recommended continued work on strengthening and slow progression as far as resisted activities.  Follow-up as needed if he has recurrent issues.      Rupetro Michelle was in agreement with plan and had all questions answered.     Orders:  Orders Placed This Encounter   Procedures   • XR Elbow 3+ View Right       Medications:  No orders of the defined types were placed in this encounter.      Followup:  Return if symptoms worsen or fail to improve.    Ruperto was seen today for follow-up.    Diagnoses and all orders for this visit:    Pain  -     XR Elbow 3+ View Right    Status post orthopedic surgery, follow-up exam, Distal biceps tendon repair, DOS 10/02/2019          Dictated utilizing Dragon dictation

## 2021-04-06 ENCOUNTER — BULK ORDERING (OUTPATIENT)
Dept: CASE MANAGEMENT | Facility: OTHER | Age: 51
End: 2021-04-06

## 2021-04-06 DIAGNOSIS — Z23 IMMUNIZATION DUE: ICD-10-CM
